# Patient Record
Sex: MALE | Race: WHITE | NOT HISPANIC OR LATINO | URBAN - METROPOLITAN AREA
[De-identification: names, ages, dates, MRNs, and addresses within clinical notes are randomized per-mention and may not be internally consistent; named-entity substitution may affect disease eponyms.]

---

## 2020-12-31 VITALS
TEMPERATURE: 98 F | OXYGEN SATURATION: 99 % | HEART RATE: 63 BPM | DIASTOLIC BLOOD PRESSURE: 53 MMHG | SYSTOLIC BLOOD PRESSURE: 139 MMHG | RESPIRATION RATE: 18 BRPM | HEIGHT: 68 IN | WEIGHT: 161.6 LBS

## 2020-12-31 RX ORDER — TAMSULOSIN HYDROCHLORIDE 0.4 MG/1
1 CAPSULE ORAL
Qty: 0 | Refills: 0 | DISCHARGE

## 2020-12-31 NOTE — PATIENT PROFILE ADULT - TOBACCO USE
Problem: VTE, Risk for  Goal: # No s/s of VTE  Outcome: Outcome Met, Continue evaluating goal progress toward completion  No s/s of VTE present. Dmitri continue to monitor.       Never smoker Former smoker

## 2020-12-31 NOTE — PRE-OP CHECKLIST - TEMPERATURE IN FAHRENHEIT (DEGREES F)
Tube feed teaching demonstrated by RN to pt's daughter who will be primarily taking care of pt upon discharge  Pt's daughter shown how to hang a bag of feed, connect and prime new tubing- as well as how to block off the J tube prior to removing the tubing connection  Pt's daughter also demonstrated correct steps to administer medication through J tube- flushed before and after- able to re-connect pt back to feed and run the feed as well  RN also discussed how often the tubing needs to be changed with the feed- as well as how often the feed get flushed- RN also showed pt how to set the feed rate and flush rate in our pump- but told the pt's dtr it might look different at home  No furthers questions at this time  98.1

## 2020-12-31 NOTE — PATIENT PROFILE ADULT - NSASFALLATTEMPTOOB_GEN_A_NUR
no

## 2021-01-04 ENCOUNTER — INPATIENT (INPATIENT)
Facility: HOSPITAL | Age: 66
LOS: 4 days | Discharge: HOME | DRG: 454 | End: 2021-01-09
Attending: NEUROLOGICAL SURGERY | Admitting: NEUROLOGICAL SURGERY
Payer: COMMERCIAL

## 2021-01-04 DIAGNOSIS — M54.9 DORSALGIA, UNSPECIFIED: ICD-10-CM

## 2021-01-04 DIAGNOSIS — Z98.1 ARTHRODESIS STATUS: ICD-10-CM

## 2021-01-04 DIAGNOSIS — I25.10 ATHEROSCLEROTIC HEART DISEASE OF NATIVE CORONARY ARTERY WITHOUT ANGINA PECTORIS: ICD-10-CM

## 2021-01-04 DIAGNOSIS — M96.3 POSTLAMINECTOMY KYPHOSIS: ICD-10-CM

## 2021-01-04 DIAGNOSIS — M43.07 SPONDYLOLYSIS, LUMBOSACRAL REGION: ICD-10-CM

## 2021-01-04 DIAGNOSIS — G97.31 INTRAOPERATIVE HEMORRHAGE AND HEMATOMA OF A NERVOUS SYSTEM ORGAN OR STRUCTURE COMPLICATING A NERVOUS SYSTEM PROCEDURE: ICD-10-CM

## 2021-01-04 DIAGNOSIS — Z98.890 OTHER SPECIFIED POSTPROCEDURAL STATES: Chronic | ICD-10-CM

## 2021-01-04 DIAGNOSIS — G97.41 ACCIDENTAL PUNCTURE OR LACERATION OF DURA DURING A PROCEDURE: ICD-10-CM

## 2021-01-04 DIAGNOSIS — Z41.9 ENCOUNTER FOR PROCEDURE FOR PURPOSES OTHER THAN REMEDYING HEALTH STATE, UNSPECIFIED: Chronic | ICD-10-CM

## 2021-01-04 DIAGNOSIS — E83.42 HYPOMAGNESEMIA: ICD-10-CM

## 2021-01-04 DIAGNOSIS — E83.39 OTHER DISORDERS OF PHOSPHORUS METABOLISM: ICD-10-CM

## 2021-01-04 DIAGNOSIS — N40.0 BENIGN PROSTATIC HYPERPLASIA WITHOUT LOWER URINARY TRACT SYMPTOMS: ICD-10-CM

## 2021-01-04 DIAGNOSIS — M48.062 SPINAL STENOSIS, LUMBAR REGION WITH NEUROGENIC CLAUDICATION: ICD-10-CM

## 2021-01-04 DIAGNOSIS — I10 ESSENTIAL (PRIMARY) HYPERTENSION: ICD-10-CM

## 2021-01-04 DIAGNOSIS — E78.5 HYPERLIPIDEMIA, UNSPECIFIED: ICD-10-CM

## 2021-01-04 DIAGNOSIS — T84.296A OTHER MECHANICAL COMPLICATION OF INTERNAL FIXATION DEVICE OF VERTEBRAE, INITIAL ENCOUNTER: ICD-10-CM

## 2021-01-04 DIAGNOSIS — Z91.013 ALLERGY TO SEAFOOD: ICD-10-CM

## 2021-01-04 DIAGNOSIS — E11.9 TYPE 2 DIABETES MELLITUS WITHOUT COMPLICATIONS: ICD-10-CM

## 2021-01-04 DIAGNOSIS — M54.17 RADICULOPATHY, LUMBOSACRAL REGION: ICD-10-CM

## 2021-01-04 LAB
ANION GAP SERPL CALC-SCNC: 15 MMOL/L — SIGNIFICANT CHANGE UP (ref 5–17)
BUN SERPL-MCNC: 15 MG/DL — SIGNIFICANT CHANGE UP (ref 7–23)
CALCIUM SERPL-MCNC: 8.1 MG/DL — LOW (ref 8.4–10.5)
CHLORIDE SERPL-SCNC: 103 MMOL/L — SIGNIFICANT CHANGE UP (ref 96–108)
CO2 SERPL-SCNC: 22 MMOL/L — SIGNIFICANT CHANGE UP (ref 22–31)
CREAT SERPL-MCNC: 0.7 MG/DL — SIGNIFICANT CHANGE UP (ref 0.5–1.3)
GLUCOSE BLDC GLUCOMTR-MCNC: 161 MG/DL — HIGH (ref 70–99)
GLUCOSE BLDC GLUCOMTR-MCNC: 211 MG/DL — HIGH (ref 70–99)
GLUCOSE BLDC GLUCOMTR-MCNC: 225 MG/DL — HIGH (ref 70–99)
GLUCOSE BLDC GLUCOMTR-MCNC: 277 MG/DL — HIGH (ref 70–99)
GLUCOSE SERPL-MCNC: 281 MG/DL — HIGH (ref 70–99)
HCT VFR BLD CALC: 33.6 % — LOW (ref 39–50)
HGB BLD-MCNC: 11.1 G/DL — LOW (ref 13–17)
MAGNESIUM SERPL-MCNC: 2 MG/DL — SIGNIFICANT CHANGE UP (ref 1.6–2.6)
MCHC RBC-ENTMCNC: 28.9 PG — SIGNIFICANT CHANGE UP (ref 27–34)
MCHC RBC-ENTMCNC: 33 GM/DL — SIGNIFICANT CHANGE UP (ref 32–36)
MCV RBC AUTO: 87.5 FL — SIGNIFICANT CHANGE UP (ref 80–100)
NRBC # BLD: 0 /100 WBCS — SIGNIFICANT CHANGE UP (ref 0–0)
PHOSPHATE SERPL-MCNC: 2.7 MG/DL — SIGNIFICANT CHANGE UP (ref 2.5–4.5)
PLATELET # BLD AUTO: 166 K/UL — SIGNIFICANT CHANGE UP (ref 150–400)
POTASSIUM SERPL-MCNC: 4 MMOL/L — SIGNIFICANT CHANGE UP (ref 3.5–5.3)
POTASSIUM SERPL-SCNC: 4 MMOL/L — SIGNIFICANT CHANGE UP (ref 3.5–5.3)
RBC # BLD: 3.84 M/UL — LOW (ref 4.2–5.8)
RBC # FLD: 13.1 % — SIGNIFICANT CHANGE UP (ref 10.3–14.5)
SODIUM SERPL-SCNC: 140 MMOL/L — SIGNIFICANT CHANGE UP (ref 135–145)
WBC # BLD: 10.54 K/UL — HIGH (ref 3.8–10.5)
WBC # FLD AUTO: 10.54 K/UL — HIGH (ref 3.8–10.5)

## 2021-01-04 PROCEDURE — 72020 X-RAY EXAM OF SPINE 1 VIEW: CPT | Mod: 26

## 2021-01-04 RX ORDER — SIMVASTATIN 20 MG/1
1 TABLET, FILM COATED ORAL
Qty: 0 | Refills: 0 | DISCHARGE

## 2021-01-04 RX ORDER — ACETAMINOPHEN 500 MG
1000 TABLET ORAL ONCE
Refills: 0 | Status: COMPLETED | OUTPATIENT
Start: 2021-01-04 | End: 2021-01-04

## 2021-01-04 RX ORDER — DULAGLUTIDE 4.5 MG/.5ML
0 INJECTION, SOLUTION SUBCUTANEOUS
Qty: 0 | Refills: 0 | DISCHARGE

## 2021-01-04 RX ORDER — DEXTROSE 50 % IN WATER 50 %
25 SYRINGE (ML) INTRAVENOUS ONCE
Refills: 0 | Status: DISCONTINUED | OUTPATIENT
Start: 2021-01-04 | End: 2021-01-09

## 2021-01-04 RX ORDER — METHOCARBAMOL 500 MG/1
500 TABLET, FILM COATED ORAL EVERY 8 HOURS
Refills: 0 | Status: DISCONTINUED | OUTPATIENT
Start: 2021-01-04 | End: 2021-01-05

## 2021-01-04 RX ORDER — SODIUM CHLORIDE 9 MG/ML
1000 INJECTION, SOLUTION INTRAVENOUS
Refills: 0 | Status: DISCONTINUED | OUTPATIENT
Start: 2021-01-04 | End: 2021-01-09

## 2021-01-04 RX ORDER — HYDROMORPHONE HYDROCHLORIDE 2 MG/ML
0.5 INJECTION INTRAMUSCULAR; INTRAVENOUS; SUBCUTANEOUS
Refills: 0 | Status: DISCONTINUED | OUTPATIENT
Start: 2021-01-04 | End: 2021-01-04

## 2021-01-04 RX ORDER — OMEPRAZOLE 10 MG/1
1 CAPSULE, DELAYED RELEASE ORAL
Qty: 0 | Refills: 0 | DISCHARGE

## 2021-01-04 RX ORDER — SITAGLIPTIN 50 MG/1
1 TABLET, FILM COATED ORAL
Qty: 0 | Refills: 0 | DISCHARGE

## 2021-01-04 RX ORDER — SODIUM CHLORIDE 9 MG/ML
1000 INJECTION, SOLUTION INTRAVENOUS
Refills: 0 | Status: DISCONTINUED | OUTPATIENT
Start: 2021-01-04 | End: 2021-01-07

## 2021-01-04 RX ORDER — BUPIVACAINE 13.3 MG/ML
20 INJECTION, SUSPENSION, LIPOSOMAL INFILTRATION ONCE
Refills: 0 | Status: DISCONTINUED | OUTPATIENT
Start: 2021-01-04 | End: 2021-01-09

## 2021-01-04 RX ORDER — SIMVASTATIN 20 MG/1
10 TABLET, FILM COATED ORAL AT BEDTIME
Refills: 0 | Status: DISCONTINUED | OUTPATIENT
Start: 2021-01-04 | End: 2021-01-09

## 2021-01-04 RX ORDER — DEXTROSE 50 % IN WATER 50 %
12.5 SYRINGE (ML) INTRAVENOUS ONCE
Refills: 0 | Status: DISCONTINUED | OUTPATIENT
Start: 2021-01-04 | End: 2021-01-09

## 2021-01-04 RX ORDER — NICOTINE POLACRILEX 2 MG
2 GUM BUCCAL
Refills: 0 | Status: DISCONTINUED | OUTPATIENT
Start: 2021-01-04 | End: 2021-01-09

## 2021-01-04 RX ORDER — HYDRALAZINE HCL 50 MG
10 TABLET ORAL
Refills: 0 | Status: DISCONTINUED | OUTPATIENT
Start: 2021-01-04 | End: 2021-01-09

## 2021-01-04 RX ORDER — ACETAMINOPHEN 500 MG
1000 TABLET ORAL EVERY 8 HOURS
Refills: 0 | Status: DISCONTINUED | OUTPATIENT
Start: 2021-01-04 | End: 2021-01-09

## 2021-01-04 RX ORDER — DIAZEPAM 5 MG
5 TABLET ORAL ONCE
Refills: 0 | Status: DISCONTINUED | OUTPATIENT
Start: 2021-01-04 | End: 2021-01-04

## 2021-01-04 RX ORDER — GLUCAGON INJECTION, SOLUTION 0.5 MG/.1ML
1 INJECTION, SOLUTION SUBCUTANEOUS ONCE
Refills: 0 | Status: DISCONTINUED | OUTPATIENT
Start: 2021-01-04 | End: 2021-01-09

## 2021-01-04 RX ORDER — METFORMIN HYDROCHLORIDE 850 MG/1
2000 TABLET ORAL
Qty: 0 | Refills: 0 | DISCHARGE

## 2021-01-04 RX ORDER — PANTOPRAZOLE SODIUM 20 MG/1
40 TABLET, DELAYED RELEASE ORAL
Refills: 0 | Status: DISCONTINUED | OUTPATIENT
Start: 2021-01-04 | End: 2021-01-09

## 2021-01-04 RX ORDER — DULOXETINE HYDROCHLORIDE 30 MG/1
20 CAPSULE, DELAYED RELEASE ORAL DAILY
Refills: 0 | Status: DISCONTINUED | OUTPATIENT
Start: 2021-01-04 | End: 2021-01-09

## 2021-01-04 RX ORDER — MAGNESIUM SULFATE 500 MG/ML
2 VIAL (ML) INJECTION
Refills: 0 | Status: COMPLETED | OUTPATIENT
Start: 2021-01-04 | End: 2021-01-04

## 2021-01-04 RX ORDER — METFORMIN HYDROCHLORIDE 850 MG/1
1 TABLET ORAL
Qty: 0 | Refills: 0 | DISCHARGE

## 2021-01-04 RX ORDER — ASPIRIN/CALCIUM CARB/MAGNESIUM 324 MG
0 TABLET ORAL
Qty: 0 | Refills: 0 | DISCHARGE

## 2021-01-04 RX ORDER — APREPITANT 80 MG/1
40 CAPSULE ORAL ONCE
Refills: 0 | Status: COMPLETED | OUTPATIENT
Start: 2021-01-04 | End: 2021-01-04

## 2021-01-04 RX ORDER — SODIUM CHLORIDE 9 MG/ML
1000 INJECTION INTRAMUSCULAR; INTRAVENOUS; SUBCUTANEOUS
Refills: 0 | Status: DISCONTINUED | OUTPATIENT
Start: 2021-01-04 | End: 2021-01-07

## 2021-01-04 RX ORDER — CEFAZOLIN SODIUM 1 G
2000 VIAL (EA) INJECTION EVERY 8 HOURS
Refills: 0 | Status: COMPLETED | OUTPATIENT
Start: 2021-01-04 | End: 2021-01-05

## 2021-01-04 RX ORDER — HYDRALAZINE HCL 50 MG
5 TABLET ORAL ONCE
Refills: 0 | Status: COMPLETED | OUTPATIENT
Start: 2021-01-04 | End: 2021-01-04

## 2021-01-04 RX ORDER — INSULIN LISPRO 100/ML
VIAL (ML) SUBCUTANEOUS
Refills: 0 | Status: DISCONTINUED | OUTPATIENT
Start: 2021-01-04 | End: 2021-01-09

## 2021-01-04 RX ORDER — DULOXETINE HYDROCHLORIDE 30 MG/1
1 CAPSULE, DELAYED RELEASE ORAL
Qty: 0 | Refills: 0 | DISCHARGE

## 2021-01-04 RX ORDER — ALBUMIN HUMAN 25 %
500 VIAL (ML) INTRAVENOUS ONCE
Refills: 0 | Status: COMPLETED | OUTPATIENT
Start: 2021-01-04 | End: 2021-01-04

## 2021-01-04 RX ORDER — METFORMIN HYDROCHLORIDE 850 MG/1
2000 TABLET ORAL DAILY
Refills: 0 | Status: DISCONTINUED | OUTPATIENT
Start: 2021-01-04 | End: 2021-01-04

## 2021-01-04 RX ORDER — OXYCODONE HYDROCHLORIDE 5 MG/1
5 TABLET ORAL
Refills: 0 | Status: DISCONTINUED | OUTPATIENT
Start: 2021-01-04 | End: 2021-01-05

## 2021-01-04 RX ORDER — ONDANSETRON 8 MG/1
4 TABLET, FILM COATED ORAL EVERY 6 HOURS
Refills: 0 | Status: DISCONTINUED | OUTPATIENT
Start: 2021-01-04 | End: 2021-01-09

## 2021-01-04 RX ORDER — METOCLOPRAMIDE HCL 10 MG
10 TABLET ORAL EVERY 8 HOURS
Refills: 0 | Status: DISCONTINUED | OUTPATIENT
Start: 2021-01-04 | End: 2021-01-09

## 2021-01-04 RX ORDER — ENOXAPARIN SODIUM 100 MG/ML
40 INJECTION SUBCUTANEOUS AT BEDTIME
Refills: 0 | Status: DISCONTINUED | OUTPATIENT
Start: 2021-01-05 | End: 2021-01-09

## 2021-01-04 RX ORDER — POVIDONE-IODINE 5 %
1 AEROSOL (ML) TOPICAL ONCE
Refills: 0 | Status: COMPLETED | OUTPATIENT
Start: 2021-01-04 | End: 2021-01-04

## 2021-01-04 RX ORDER — SENNA PLUS 8.6 MG/1
2 TABLET ORAL AT BEDTIME
Refills: 0 | Status: DISCONTINUED | OUTPATIENT
Start: 2021-01-04 | End: 2021-01-09

## 2021-01-04 RX ORDER — DEXTROSE 50 % IN WATER 50 %
15 SYRINGE (ML) INTRAVENOUS ONCE
Refills: 0 | Status: DISCONTINUED | OUTPATIENT
Start: 2021-01-04 | End: 2021-01-09

## 2021-01-04 RX ORDER — LABETALOL HCL 100 MG
10 TABLET ORAL ONCE
Refills: 0 | Status: COMPLETED | OUTPATIENT
Start: 2021-01-04 | End: 2021-01-04

## 2021-01-04 RX ORDER — GABAPENTIN 400 MG/1
300 CAPSULE ORAL ONCE
Refills: 0 | Status: COMPLETED | OUTPATIENT
Start: 2021-01-04 | End: 2021-01-04

## 2021-01-04 RX ADMIN — Medication 4: at 23:05

## 2021-01-04 RX ADMIN — Medication 100 MILLIGRAM(S): at 19:25

## 2021-01-04 RX ADMIN — Medication 10 MILLIGRAM(S): at 14:42

## 2021-01-04 RX ADMIN — OXYCODONE HYDROCHLORIDE 5 MILLIGRAM(S): 5 TABLET ORAL at 21:36

## 2021-01-04 RX ADMIN — Medication 5 MILLIGRAM(S): at 19:24

## 2021-01-04 RX ADMIN — Medication 250 MILLILITER(S): at 09:00

## 2021-01-04 RX ADMIN — HYDROMORPHONE HYDROCHLORIDE 0.5 MILLIGRAM(S): 2 INJECTION INTRAMUSCULAR; INTRAVENOUS; SUBCUTANEOUS at 15:48

## 2021-01-04 RX ADMIN — GABAPENTIN 300 MILLIGRAM(S): 400 CAPSULE ORAL at 06:45

## 2021-01-04 RX ADMIN — Medication 5 MILLIGRAM(S): at 16:25

## 2021-01-04 RX ADMIN — HYDROMORPHONE HYDROCHLORIDE 0.5 MILLIGRAM(S): 2 INJECTION INTRAMUSCULAR; INTRAVENOUS; SUBCUTANEOUS at 18:31

## 2021-01-04 RX ADMIN — Medication 100 GRAM(S): at 09:30

## 2021-01-04 RX ADMIN — SENNA PLUS 2 TABLET(S): 8.6 TABLET ORAL at 23:05

## 2021-01-04 RX ADMIN — ONDANSETRON 4 MILLIGRAM(S): 8 TABLET, FILM COATED ORAL at 17:38

## 2021-01-04 RX ADMIN — Medication 50 MILLIGRAM(S): at 23:04

## 2021-01-04 RX ADMIN — HYDROMORPHONE HYDROCHLORIDE 0.5 MILLIGRAM(S): 2 INJECTION INTRAMUSCULAR; INTRAVENOUS; SUBCUTANEOUS at 14:53

## 2021-01-04 RX ADMIN — Medication 1000 MILLIGRAM(S): at 06:48

## 2021-01-04 RX ADMIN — Medication 1 APPLICATION(S): at 06:51

## 2021-01-04 RX ADMIN — HYDROMORPHONE HYDROCHLORIDE 0.5 MILLIGRAM(S): 2 INJECTION INTRAMUSCULAR; INTRAVENOUS; SUBCUTANEOUS at 15:41

## 2021-01-04 RX ADMIN — HYDROMORPHONE HYDROCHLORIDE 0.5 MILLIGRAM(S): 2 INJECTION INTRAMUSCULAR; INTRAVENOUS; SUBCUTANEOUS at 15:38

## 2021-01-04 RX ADMIN — HYDROMORPHONE HYDROCHLORIDE 0.5 MILLIGRAM(S): 2 INJECTION INTRAMUSCULAR; INTRAVENOUS; SUBCUTANEOUS at 17:46

## 2021-01-04 RX ADMIN — Medication 2 MILLIGRAM(S): at 23:05

## 2021-01-04 RX ADMIN — Medication 1000 MILLIGRAM(S): at 06:45

## 2021-01-04 RX ADMIN — SIMVASTATIN 10 MILLIGRAM(S): 20 TABLET, FILM COATED ORAL at 23:04

## 2021-01-04 RX ADMIN — Medication 6: at 14:52

## 2021-01-04 RX ADMIN — OXYCODONE HYDROCHLORIDE 5 MILLIGRAM(S): 5 TABLET ORAL at 21:10

## 2021-01-04 RX ADMIN — Medication 1000 MILLIGRAM(S): at 23:04

## 2021-01-04 RX ADMIN — Medication 100 GRAM(S): at 08:30

## 2021-01-04 RX ADMIN — APREPITANT 40 MILLIGRAM(S): 80 CAPSULE ORAL at 06:45

## 2021-01-04 RX ADMIN — METHOCARBAMOL 500 MILLIGRAM(S): 500 TABLET, FILM COATED ORAL at 23:04

## 2021-01-04 RX ADMIN — Medication 10 MILLIGRAM(S): at 18:33

## 2021-01-04 NOTE — H&P ADULT - HISTORY OF PRESENT ILLNESS
patient presents with chronic back pain progressing over years. He had a laminectomy 30 years ago as well as 2 anterior cervical surgeries in 2012, and 2017. all surgeries went fine with good recovery. in december 2017 he had a L2-S1 Decompression and fusion for back and left leg pain which improved post op.

## 2021-01-04 NOTE — CHART NOTE - NSCHARTNOTEFT_GEN_A_CORE
Neurosurgical Indications for Screening Dopplers on Admission:       1) Known hypercoagulation disorder (h/o VTE, thrombophilia, HIT, etc.)   2) Admitted from prolonged stay from another institution (straight forward ER transfers not included)  3) Presenting with significant leg immobility   4) Presenting with signs and symptoms of VTE?    5) With significant critical illness, Including "found down" for unknown period of time in HPI  6) With significant neurotrauma (TBI, SCI / TLS spine fractures)   7) Who are comatose   8) With known malignancy (e.g. glioblastoma multiforme, meningioma, etc.). Excludes IA chemo 23hr observation stays  9) On hemodialysis   10) Who have received platelet transfusion or antithrombotic reversal agents recently   11) Who have had recent major orthopedic surgery          Screening dopplers indicated?   Y _   N x    DVT Prophylaxis:  _x SCD's   _x chemoprophylaxis    Lovenox 40mg SQ QHS start POD1

## 2021-01-04 NOTE — BRIEF OPERATIVE NOTE - NSICDXBRIEFPROCEDURE_GEN_ALL_CORE_FT
PROCEDURES:  Fusion, spine, lumbar, TLIF, complex 04-Jan-2021 15:23:30  Alex Porter  Fusion, spine, lumbar, PLIF, with discectomy or laminectomy 04-Jan-2021 15:23:19  Alex Porter

## 2021-01-04 NOTE — H&P ADULT - NSICDXPASTSURGICALHX_GEN_ALL_CORE_FT
PAST SURGICAL HISTORY:  H/O laminectomy     Surgery, elective cervical spine  2010, 2017    Surgery, elective lumbar fusion 2017

## 2021-01-04 NOTE — H&P ADULT - PROBLEM SELECTOR PLAN 1
Proceed with sx  - Brace Via Abel  - keep flat until 1/6 due to ventral dural tear  - Drains off suction due to dural tear  - Follow up with Dr. Isaías rausch in terms of wound care

## 2021-01-04 NOTE — H&P ADULT - NSHPREVIEWOFSYSTEMS_GEN_ALL_CORE
REVIEW OF SYSTEMS      General:	no recent illnesses, no recent wt gain/loss    Skin/Breast:  intact  	  Ophthalmologic:  negative,   	  ENMT:	negative    Respiratory and Thorax: no coughing, wheezing, recent URI  	  Cardiovascular: no chest pain, DWYER    Gastrointestinal:	soft, non tender    Genitourinary: no frequency, dysuria    Musculoskeletal:	negative    Neurological:	see HPI    Psychiatric:	negative    Hematology/Lymphatics:	negative    Endocrine:  	negative    Allergic/Immunologic:  Negative

## 2021-01-04 NOTE — H&P ADULT - NSHPPHYSICALEXAM_GEN_ALL_CORE
AOX3, sitting comfortably in Pre op holding  5/5 Bilateral lower extremities  1+ bilateral patellar reflexes 1+ bilateral achilles reflexes  no sensory deficits of lower extremities   Tenderness to palpation b/l SIJs

## 2021-01-05 LAB
A1C WITH ESTIMATED AVERAGE GLUCOSE RESULT: 6.9 % — HIGH (ref 4–5.6)
ANION GAP SERPL CALC-SCNC: 10 MMOL/L — SIGNIFICANT CHANGE UP (ref 5–17)
BUN SERPL-MCNC: 14 MG/DL — SIGNIFICANT CHANGE UP (ref 7–23)
CALCIUM SERPL-MCNC: 8.7 MG/DL — SIGNIFICANT CHANGE UP (ref 8.4–10.5)
CHLORIDE SERPL-SCNC: 105 MMOL/L — SIGNIFICANT CHANGE UP (ref 96–108)
CO2 SERPL-SCNC: 27 MMOL/L — SIGNIFICANT CHANGE UP (ref 22–31)
CREAT SERPL-MCNC: 0.7 MG/DL — SIGNIFICANT CHANGE UP (ref 0.5–1.3)
ESTIMATED AVERAGE GLUCOSE: 151 MG/DL — HIGH (ref 68–114)
GLUCOSE BLDC GLUCOMTR-MCNC: 135 MG/DL — HIGH (ref 70–99)
GLUCOSE BLDC GLUCOMTR-MCNC: 145 MG/DL — HIGH (ref 70–99)
GLUCOSE BLDC GLUCOMTR-MCNC: 158 MG/DL — HIGH (ref 70–99)
GLUCOSE BLDC GLUCOMTR-MCNC: 214 MG/DL — HIGH (ref 70–99)
GLUCOSE SERPL-MCNC: 230 MG/DL — HIGH (ref 70–99)
HCT VFR BLD CALC: 32.9 % — LOW (ref 39–50)
HCV AB S/CO SERPL IA: 0.04 S/CO — SIGNIFICANT CHANGE UP
HCV AB SERPL-IMP: SIGNIFICANT CHANGE UP
HGB BLD-MCNC: 10.8 G/DL — LOW (ref 13–17)
MAGNESIUM SERPL-MCNC: 2.1 MG/DL — SIGNIFICANT CHANGE UP (ref 1.6–2.6)
MCHC RBC-ENTMCNC: 29.6 PG — SIGNIFICANT CHANGE UP (ref 27–34)
MCHC RBC-ENTMCNC: 32.8 GM/DL — SIGNIFICANT CHANGE UP (ref 32–36)
MCV RBC AUTO: 90.1 FL — SIGNIFICANT CHANGE UP (ref 80–100)
NRBC # BLD: 0 /100 WBCS — SIGNIFICANT CHANGE UP (ref 0–0)
PHOSPHATE SERPL-MCNC: 2.4 MG/DL — LOW (ref 2.5–4.5)
PLATELET # BLD AUTO: 177 K/UL — SIGNIFICANT CHANGE UP (ref 150–400)
POTASSIUM SERPL-MCNC: 4.5 MMOL/L — SIGNIFICANT CHANGE UP (ref 3.5–5.3)
POTASSIUM SERPL-SCNC: 4.5 MMOL/L — SIGNIFICANT CHANGE UP (ref 3.5–5.3)
RBC # BLD: 3.65 M/UL — LOW (ref 4.2–5.8)
RBC # FLD: 13.5 % — SIGNIFICANT CHANGE UP (ref 10.3–14.5)
SODIUM SERPL-SCNC: 142 MMOL/L — SIGNIFICANT CHANGE UP (ref 135–145)
WBC # BLD: 15.8 K/UL — HIGH (ref 3.8–10.5)
WBC # FLD AUTO: 15.8 K/UL — HIGH (ref 3.8–10.5)

## 2021-01-05 PROCEDURE — 99024 POSTOP FOLLOW-UP VISIT: CPT

## 2021-01-05 RX ORDER — OXYCODONE HYDROCHLORIDE 5 MG/1
5 TABLET ORAL EVERY 4 HOURS
Refills: 0 | Status: DISCONTINUED | OUTPATIENT
Start: 2021-01-05 | End: 2021-01-09

## 2021-01-05 RX ORDER — OXYCODONE HYDROCHLORIDE 5 MG/1
10 TABLET ORAL EVERY 6 HOURS
Refills: 0 | Status: DISCONTINUED | OUTPATIENT
Start: 2021-01-05 | End: 2021-01-05

## 2021-01-05 RX ORDER — OXYCODONE HYDROCHLORIDE 5 MG/1
10 TABLET ORAL EVERY 4 HOURS
Refills: 0 | Status: DISCONTINUED | OUTPATIENT
Start: 2021-01-05 | End: 2021-01-09

## 2021-01-05 RX ORDER — OXYCODONE AND ACETAMINOPHEN 5; 325 MG/1; MG/1
1 TABLET ORAL EVERY 4 HOURS
Refills: 0 | Status: DISCONTINUED | OUTPATIENT
Start: 2021-01-05 | End: 2021-01-05

## 2021-01-05 RX ORDER — METHOCARBAMOL 500 MG/1
750 TABLET, FILM COATED ORAL EVERY 8 HOURS
Refills: 0 | Status: DISCONTINUED | OUTPATIENT
Start: 2021-01-05 | End: 2021-01-08

## 2021-01-05 RX ADMIN — Medication 100 MILLIGRAM(S): at 03:23

## 2021-01-05 RX ADMIN — DULOXETINE HYDROCHLORIDE 20 MILLIGRAM(S): 30 CAPSULE, DELAYED RELEASE ORAL at 10:35

## 2021-01-05 RX ADMIN — Medication 2 MILLIGRAM(S): at 18:05

## 2021-01-05 RX ADMIN — ONDANSETRON 4 MILLIGRAM(S): 8 TABLET, FILM COATED ORAL at 18:06

## 2021-01-05 RX ADMIN — Medication 75 MILLIGRAM(S): at 21:42

## 2021-01-05 RX ADMIN — ONDANSETRON 4 MILLIGRAM(S): 8 TABLET, FILM COATED ORAL at 12:14

## 2021-01-05 RX ADMIN — Medication 1000 MILLIGRAM(S): at 15:10

## 2021-01-05 RX ADMIN — SENNA PLUS 2 TABLET(S): 8.6 TABLET ORAL at 21:41

## 2021-01-05 RX ADMIN — OXYCODONE HYDROCHLORIDE 10 MILLIGRAM(S): 5 TABLET ORAL at 12:20

## 2021-01-05 RX ADMIN — Medication 1000 MILLIGRAM(S): at 14:16

## 2021-01-05 RX ADMIN — Medication 75 MILLIGRAM(S): at 14:16

## 2021-01-05 RX ADMIN — OXYCODONE HYDROCHLORIDE 10 MILLIGRAM(S): 5 TABLET ORAL at 13:17

## 2021-01-05 RX ADMIN — Medication 1000 MILLIGRAM(S): at 07:14

## 2021-01-05 RX ADMIN — SODIUM CHLORIDE 100 MILLILITER(S): 9 INJECTION INTRAMUSCULAR; INTRAVENOUS; SUBCUTANEOUS at 02:20

## 2021-01-05 RX ADMIN — Medication 4: at 07:29

## 2021-01-05 RX ADMIN — Medication 1000 MILLIGRAM(S): at 21:41

## 2021-01-05 RX ADMIN — METHOCARBAMOL 500 MILLIGRAM(S): 500 TABLET, FILM COATED ORAL at 07:14

## 2021-01-05 RX ADMIN — OXYCODONE HYDROCHLORIDE 5 MILLIGRAM(S): 5 TABLET ORAL at 08:14

## 2021-01-05 RX ADMIN — Medication 25 MILLIGRAM(S): at 10:35

## 2021-01-05 RX ADMIN — METHOCARBAMOL 750 MILLIGRAM(S): 500 TABLET, FILM COATED ORAL at 21:52

## 2021-01-05 RX ADMIN — PANTOPRAZOLE SODIUM 40 MILLIGRAM(S): 20 TABLET, DELAYED RELEASE ORAL at 07:14

## 2021-01-05 RX ADMIN — SIMVASTATIN 10 MILLIGRAM(S): 20 TABLET, FILM COATED ORAL at 21:42

## 2021-01-05 RX ADMIN — Medication 2 MILLIGRAM(S): at 07:14

## 2021-01-05 RX ADMIN — Medication 2 MILLIGRAM(S): at 23:05

## 2021-01-05 RX ADMIN — Medication 1000 MILLIGRAM(S): at 00:00

## 2021-01-05 RX ADMIN — OXYCODONE HYDROCHLORIDE 5 MILLIGRAM(S): 5 TABLET ORAL at 18:04

## 2021-01-05 RX ADMIN — Medication 2: at 22:45

## 2021-01-05 RX ADMIN — OXYCODONE HYDROCHLORIDE 5 MILLIGRAM(S): 5 TABLET ORAL at 19:00

## 2021-01-05 RX ADMIN — Medication 2 MILLIGRAM(S): at 12:14

## 2021-01-05 RX ADMIN — Medication 1000 MILLIGRAM(S): at 22:41

## 2021-01-05 RX ADMIN — METHOCARBAMOL 750 MILLIGRAM(S): 500 TABLET, FILM COATED ORAL at 14:16

## 2021-01-05 RX ADMIN — ENOXAPARIN SODIUM 40 MILLIGRAM(S): 100 INJECTION SUBCUTANEOUS at 21:42

## 2021-01-05 RX ADMIN — SODIUM CHLORIDE 100 MILLILITER(S): 9 INJECTION INTRAMUSCULAR; INTRAVENOUS; SUBCUTANEOUS at 21:52

## 2021-01-05 RX ADMIN — Medication 50 MILLIGRAM(S): at 07:14

## 2021-01-05 RX ADMIN — Medication 1000 MILLIGRAM(S): at 08:14

## 2021-01-05 RX ADMIN — SODIUM CHLORIDE 100 MILLILITER(S): 9 INJECTION INTRAMUSCULAR; INTRAVENOUS; SUBCUTANEOUS at 12:20

## 2021-01-05 RX ADMIN — OXYCODONE HYDROCHLORIDE 5 MILLIGRAM(S): 5 TABLET ORAL at 07:15

## 2021-01-05 NOTE — PROGRESS NOTE ADULT - ATTENDING COMMENTS
lying flat  incisional pain moderate  denies leg pain  5- left IP otherwise 5/5 IP Q TA EHL gastroc bilat, lt touch symmetric    flat in bed, may turn side to side  plastics/drains  discussed anticipated recovery process, mobilize with PT tomorrow

## 2021-01-06 LAB
ANION GAP SERPL CALC-SCNC: 9 MMOL/L — SIGNIFICANT CHANGE UP (ref 5–17)
APPEARANCE UR: CLEAR — SIGNIFICANT CHANGE UP
BACTERIA # UR AUTO: PRESENT /HPF
BILIRUB UR-MCNC: NEGATIVE — SIGNIFICANT CHANGE UP
BUN SERPL-MCNC: 11 MG/DL — SIGNIFICANT CHANGE UP (ref 7–23)
CALCIUM SERPL-MCNC: 8 MG/DL — LOW (ref 8.4–10.5)
CHLORIDE SERPL-SCNC: 103 MMOL/L — SIGNIFICANT CHANGE UP (ref 96–108)
CO2 SERPL-SCNC: 26 MMOL/L — SIGNIFICANT CHANGE UP (ref 22–31)
COLOR SPEC: YELLOW — SIGNIFICANT CHANGE UP
CREAT SERPL-MCNC: 0.71 MG/DL — SIGNIFICANT CHANGE UP (ref 0.5–1.3)
DIFF PNL FLD: ABNORMAL
EPI CELLS # UR: SIGNIFICANT CHANGE UP /HPF (ref 0–5)
GLUCOSE BLDC GLUCOMTR-MCNC: 150 MG/DL — HIGH (ref 70–99)
GLUCOSE BLDC GLUCOMTR-MCNC: 165 MG/DL — HIGH (ref 70–99)
GLUCOSE BLDC GLUCOMTR-MCNC: 198 MG/DL — HIGH (ref 70–99)
GLUCOSE BLDC GLUCOMTR-MCNC: 209 MG/DL — HIGH (ref 70–99)
GLUCOSE SERPL-MCNC: 166 MG/DL — HIGH (ref 70–99)
GLUCOSE UR QL: >=1000
HCT VFR BLD CALC: 29.7 % — LOW (ref 39–50)
HGB BLD-MCNC: 9.7 G/DL — LOW (ref 13–17)
KETONES UR-MCNC: 15 MG/DL
LEUKOCYTE ESTERASE UR-ACNC: NEGATIVE — SIGNIFICANT CHANGE UP
MAGNESIUM SERPL-MCNC: 1.4 MG/DL — LOW (ref 1.6–2.6)
MCHC RBC-ENTMCNC: 29.7 PG — SIGNIFICANT CHANGE UP (ref 27–34)
MCHC RBC-ENTMCNC: 32.7 GM/DL — SIGNIFICANT CHANGE UP (ref 32–36)
MCV RBC AUTO: 90.8 FL — SIGNIFICANT CHANGE UP (ref 80–100)
NITRITE UR-MCNC: NEGATIVE — SIGNIFICANT CHANGE UP
NRBC # BLD: 0 /100 WBCS — SIGNIFICANT CHANGE UP (ref 0–0)
PH UR: 6.5 — SIGNIFICANT CHANGE UP (ref 5–8)
PHOSPHATE SERPL-MCNC: 1.9 MG/DL — LOW (ref 2.5–4.5)
PLATELET # BLD AUTO: 156 K/UL — SIGNIFICANT CHANGE UP (ref 150–400)
POTASSIUM SERPL-MCNC: 4.1 MMOL/L — SIGNIFICANT CHANGE UP (ref 3.5–5.3)
POTASSIUM SERPL-SCNC: 4.1 MMOL/L — SIGNIFICANT CHANGE UP (ref 3.5–5.3)
PROT UR-MCNC: NEGATIVE MG/DL — SIGNIFICANT CHANGE UP
RBC # BLD: 3.27 M/UL — LOW (ref 4.2–5.8)
RBC # FLD: 13.6 % — SIGNIFICANT CHANGE UP (ref 10.3–14.5)
RBC CASTS # UR COMP ASSIST: < 5 /HPF — SIGNIFICANT CHANGE UP
SODIUM SERPL-SCNC: 138 MMOL/L — SIGNIFICANT CHANGE UP (ref 135–145)
SP GR SPEC: 1.01 — SIGNIFICANT CHANGE UP (ref 1–1.03)
UROBILINOGEN FLD QL: 0.2 E.U./DL — SIGNIFICANT CHANGE UP
WBC # BLD: 10.66 K/UL — HIGH (ref 3.8–10.5)
WBC # FLD AUTO: 10.66 K/UL — HIGH (ref 3.8–10.5)
WBC UR QL: < 5 /HPF — SIGNIFICANT CHANGE UP

## 2021-01-06 PROCEDURE — 71045 X-RAY EXAM CHEST 1 VIEW: CPT | Mod: 26

## 2021-01-06 PROCEDURE — 99024 POSTOP FOLLOW-UP VISIT: CPT

## 2021-01-06 RX ORDER — BACITRACIN ZINC 500 UNIT/G
1 OINTMENT IN PACKET (EA) TOPICAL
Refills: 0 | Status: DISCONTINUED | OUTPATIENT
Start: 2021-01-06 | End: 2021-01-09

## 2021-01-06 RX ORDER — SODIUM,POTASSIUM PHOSPHATES 278-250MG
1 POWDER IN PACKET (EA) ORAL ONCE
Refills: 0 | Status: COMPLETED | OUTPATIENT
Start: 2021-01-06 | End: 2021-01-07

## 2021-01-06 RX ORDER — MAGNESIUM SULFATE 500 MG/ML
1 VIAL (ML) INJECTION ONCE
Refills: 0 | Status: COMPLETED | OUTPATIENT
Start: 2021-01-06 | End: 2021-01-06

## 2021-01-06 RX ADMIN — Medication 1000 MILLIGRAM(S): at 15:05

## 2021-01-06 RX ADMIN — OXYCODONE HYDROCHLORIDE 5 MILLIGRAM(S): 5 TABLET ORAL at 03:23

## 2021-01-06 RX ADMIN — OXYCODONE HYDROCHLORIDE 5 MILLIGRAM(S): 5 TABLET ORAL at 12:15

## 2021-01-06 RX ADMIN — Medication 2 MILLIGRAM(S): at 12:17

## 2021-01-06 RX ADMIN — METHOCARBAMOL 750 MILLIGRAM(S): 500 TABLET, FILM COATED ORAL at 22:51

## 2021-01-06 RX ADMIN — SENNA PLUS 2 TABLET(S): 8.6 TABLET ORAL at 21:02

## 2021-01-06 RX ADMIN — Medication 1 APPLICATION(S): at 17:30

## 2021-01-06 RX ADMIN — ENOXAPARIN SODIUM 40 MILLIGRAM(S): 100 INJECTION SUBCUTANEOUS at 21:02

## 2021-01-06 RX ADMIN — METHOCARBAMOL 750 MILLIGRAM(S): 500 TABLET, FILM COATED ORAL at 15:05

## 2021-01-06 RX ADMIN — Medication 10 MILLIGRAM(S): at 07:17

## 2021-01-06 RX ADMIN — Medication 2 MILLIGRAM(S): at 17:30

## 2021-01-06 RX ADMIN — Medication 75 MILLIGRAM(S): at 12:17

## 2021-01-06 RX ADMIN — ONDANSETRON 4 MILLIGRAM(S): 8 TABLET, FILM COATED ORAL at 03:23

## 2021-01-06 RX ADMIN — DULOXETINE HYDROCHLORIDE 20 MILLIGRAM(S): 30 CAPSULE, DELAYED RELEASE ORAL at 12:17

## 2021-01-06 RX ADMIN — PANTOPRAZOLE SODIUM 40 MILLIGRAM(S): 20 TABLET, DELAYED RELEASE ORAL at 06:22

## 2021-01-06 RX ADMIN — Medication 2: at 17:33

## 2021-01-06 RX ADMIN — SODIUM CHLORIDE 100 MILLILITER(S): 9 INJECTION INTRAMUSCULAR; INTRAVENOUS; SUBCUTANEOUS at 07:20

## 2021-01-06 RX ADMIN — OXYCODONE HYDROCHLORIDE 5 MILLIGRAM(S): 5 TABLET ORAL at 04:00

## 2021-01-06 RX ADMIN — Medication 75 MILLIGRAM(S): at 06:22

## 2021-01-06 RX ADMIN — ONDANSETRON 4 MILLIGRAM(S): 8 TABLET, FILM COATED ORAL at 12:15

## 2021-01-06 RX ADMIN — Medication 100 GRAM(S): at 17:30

## 2021-01-06 RX ADMIN — Medication 1000 MILLIGRAM(S): at 21:01

## 2021-01-06 RX ADMIN — METHOCARBAMOL 750 MILLIGRAM(S): 500 TABLET, FILM COATED ORAL at 06:22

## 2021-01-06 RX ADMIN — SIMVASTATIN 10 MILLIGRAM(S): 20 TABLET, FILM COATED ORAL at 21:02

## 2021-01-06 RX ADMIN — Medication 2 MILLIGRAM(S): at 06:22

## 2021-01-06 RX ADMIN — Medication 75 MILLIGRAM(S): at 21:02

## 2021-01-06 RX ADMIN — Medication 2: at 12:18

## 2021-01-06 RX ADMIN — Medication 1000 MILLIGRAM(S): at 06:22

## 2021-01-06 RX ADMIN — Medication 4: at 22:50

## 2021-01-06 NOTE — PHYSICAL THERAPY INITIAL EVALUATION ADULT - ADDITIONAL COMMENTS
Pt lives in a family home in [place] with 5 steps to enter home. [facility] after d/c to an elevator access apartment with no steps to enter.

## 2021-01-06 NOTE — PHYSICAL THERAPY INITIAL EVALUATION ADULT - PERTINENT HX OF CURRENT PROBLEM, REHAB EVAL
patient presents with chronic back pain progressing over years. He had a laminectomy 30 years ago as well as 2 anterior cervical surgeries in 2012, and 2017. all surgeries went fine with good recovery. in december 2017 he had a L2-S1 Decompression and fusion for back and left leg pain which improved post op

## 2021-01-07 LAB
ALBUMIN SERPL ELPH-MCNC: 3.3 G/DL — SIGNIFICANT CHANGE UP (ref 3.3–5)
ALP SERPL-CCNC: 67 U/L — SIGNIFICANT CHANGE UP (ref 40–120)
ALT FLD-CCNC: 12 U/L — SIGNIFICANT CHANGE UP (ref 10–45)
ANION GAP SERPL CALC-SCNC: 9 MMOL/L — SIGNIFICANT CHANGE UP (ref 5–17)
AST SERPL-CCNC: 16 U/L — SIGNIFICANT CHANGE UP (ref 10–40)
BILIRUB SERPL-MCNC: 0.5 MG/DL — SIGNIFICANT CHANGE UP (ref 0.2–1.2)
BUN SERPL-MCNC: 8 MG/DL — SIGNIFICANT CHANGE UP (ref 7–23)
CALCIUM SERPL-MCNC: 8.5 MG/DL — SIGNIFICANT CHANGE UP (ref 8.4–10.5)
CHLORIDE SERPL-SCNC: 101 MMOL/L — SIGNIFICANT CHANGE UP (ref 96–108)
CO2 SERPL-SCNC: 27 MMOL/L — SIGNIFICANT CHANGE UP (ref 22–31)
CREAT SERPL-MCNC: 0.71 MG/DL — SIGNIFICANT CHANGE UP (ref 0.5–1.3)
GLUCOSE BLDC GLUCOMTR-MCNC: 180 MG/DL — HIGH (ref 70–99)
GLUCOSE BLDC GLUCOMTR-MCNC: 226 MG/DL — HIGH (ref 70–99)
GLUCOSE BLDC GLUCOMTR-MCNC: 241 MG/DL — HIGH (ref 70–99)
GLUCOSE BLDC GLUCOMTR-MCNC: 246 MG/DL — HIGH (ref 70–99)
GLUCOSE SERPL-MCNC: 184 MG/DL — HIGH (ref 70–99)
HCT VFR BLD CALC: 31.5 % — LOW (ref 39–50)
HGB BLD-MCNC: 10.3 G/DL — LOW (ref 13–17)
MCHC RBC-ENTMCNC: 29.4 PG — SIGNIFICANT CHANGE UP (ref 27–34)
MCHC RBC-ENTMCNC: 32.7 GM/DL — SIGNIFICANT CHANGE UP (ref 32–36)
MCV RBC AUTO: 90 FL — SIGNIFICANT CHANGE UP (ref 80–100)
NRBC # BLD: 0 /100 WBCS — SIGNIFICANT CHANGE UP (ref 0–0)
PLATELET # BLD AUTO: 148 K/UL — LOW (ref 150–400)
POTASSIUM SERPL-MCNC: 4.4 MMOL/L — SIGNIFICANT CHANGE UP (ref 3.5–5.3)
POTASSIUM SERPL-SCNC: 4.4 MMOL/L — SIGNIFICANT CHANGE UP (ref 3.5–5.3)
PROT SERPL-MCNC: 6 G/DL — SIGNIFICANT CHANGE UP (ref 6–8.3)
RBC # BLD: 3.5 M/UL — LOW (ref 4.2–5.8)
RBC # FLD: 13.2 % — SIGNIFICANT CHANGE UP (ref 10.3–14.5)
SODIUM SERPL-SCNC: 137 MMOL/L — SIGNIFICANT CHANGE UP (ref 135–145)
WBC # BLD: 10.59 K/UL — HIGH (ref 3.8–10.5)
WBC # FLD AUTO: 10.59 K/UL — HIGH (ref 3.8–10.5)

## 2021-01-07 PROCEDURE — 74018 RADEX ABDOMEN 1 VIEW: CPT | Mod: 26

## 2021-01-07 PROCEDURE — 99024 POSTOP FOLLOW-UP VISIT: CPT

## 2021-01-07 RX ORDER — LISINOPRIL 2.5 MG/1
40 TABLET ORAL DAILY
Refills: 0 | Status: DISCONTINUED | OUTPATIENT
Start: 2021-01-07 | End: 2021-01-09

## 2021-01-07 RX ORDER — TAMSULOSIN HYDROCHLORIDE 0.4 MG/1
0.4 CAPSULE ORAL AT BEDTIME
Refills: 0 | Status: DISCONTINUED | OUTPATIENT
Start: 2021-01-07 | End: 2021-01-09

## 2021-01-07 RX ORDER — SODIUM CHLORIDE 9 MG/ML
1000 INJECTION INTRAMUSCULAR; INTRAVENOUS; SUBCUTANEOUS
Refills: 0 | Status: DISCONTINUED | OUTPATIENT
Start: 2021-01-07 | End: 2021-01-09

## 2021-01-07 RX ORDER — POLYETHYLENE GLYCOL 3350 17 G/17G
17 POWDER, FOR SOLUTION ORAL DAILY
Refills: 0 | Status: DISCONTINUED | OUTPATIENT
Start: 2021-01-07 | End: 2021-01-09

## 2021-01-07 RX ORDER — POLYETHYLENE GLYCOL 3350 17 G/17G
17 POWDER, FOR SOLUTION ORAL ONCE
Refills: 0 | Status: COMPLETED | OUTPATIENT
Start: 2021-01-07 | End: 2021-01-07

## 2021-01-07 RX ADMIN — Medication 1 APPLICATION(S): at 17:54

## 2021-01-07 RX ADMIN — Medication 1000 MILLIGRAM(S): at 13:49

## 2021-01-07 RX ADMIN — Medication 1 PACKET(S): at 08:48

## 2021-01-07 RX ADMIN — SENNA PLUS 2 TABLET(S): 8.6 TABLET ORAL at 22:00

## 2021-01-07 RX ADMIN — Medication 75 MILLIGRAM(S): at 07:25

## 2021-01-07 RX ADMIN — LISINOPRIL 40 MILLIGRAM(S): 2.5 TABLET ORAL at 10:09

## 2021-01-07 RX ADMIN — Medication 1 APPLICATION(S): at 07:24

## 2021-01-07 RX ADMIN — DULOXETINE HYDROCHLORIDE 20 MILLIGRAM(S): 30 CAPSULE, DELAYED RELEASE ORAL at 12:28

## 2021-01-07 RX ADMIN — Medication 4: at 17:16

## 2021-01-07 RX ADMIN — Medication 4: at 12:32

## 2021-01-07 RX ADMIN — METHOCARBAMOL 750 MILLIGRAM(S): 500 TABLET, FILM COATED ORAL at 13:49

## 2021-01-07 RX ADMIN — Medication 2: at 08:48

## 2021-01-07 RX ADMIN — Medication 2 MILLIGRAM(S): at 12:28

## 2021-01-07 RX ADMIN — POLYETHYLENE GLYCOL 3350 17 GRAM(S): 17 POWDER, FOR SOLUTION ORAL at 10:09

## 2021-01-07 RX ADMIN — METHOCARBAMOL 750 MILLIGRAM(S): 500 TABLET, FILM COATED ORAL at 23:34

## 2021-01-07 RX ADMIN — ENOXAPARIN SODIUM 40 MILLIGRAM(S): 100 INJECTION SUBCUTANEOUS at 22:00

## 2021-01-07 RX ADMIN — Medication 1000 MILLIGRAM(S): at 22:00

## 2021-01-07 RX ADMIN — SIMVASTATIN 10 MILLIGRAM(S): 20 TABLET, FILM COATED ORAL at 22:00

## 2021-01-07 RX ADMIN — Medication 75 MILLIGRAM(S): at 22:00

## 2021-01-07 RX ADMIN — TAMSULOSIN HYDROCHLORIDE 0.4 MILLIGRAM(S): 0.4 CAPSULE ORAL at 10:09

## 2021-01-07 RX ADMIN — ONDANSETRON 4 MILLIGRAM(S): 8 TABLET, FILM COATED ORAL at 17:54

## 2021-01-07 RX ADMIN — Medication 2 MILLIGRAM(S): at 07:26

## 2021-01-07 RX ADMIN — Medication 2 MILLIGRAM(S): at 23:34

## 2021-01-07 RX ADMIN — OXYCODONE HYDROCHLORIDE 10 MILLIGRAM(S): 5 TABLET ORAL at 12:29

## 2021-01-07 RX ADMIN — SODIUM CHLORIDE 75 MILLILITER(S): 9 INJECTION INTRAMUSCULAR; INTRAVENOUS; SUBCUTANEOUS at 13:50

## 2021-01-07 RX ADMIN — METHOCARBAMOL 750 MILLIGRAM(S): 500 TABLET, FILM COATED ORAL at 07:26

## 2021-01-07 RX ADMIN — Medication 1000 MILLIGRAM(S): at 07:24

## 2021-01-07 RX ADMIN — ONDANSETRON 4 MILLIGRAM(S): 8 TABLET, FILM COATED ORAL at 12:28

## 2021-01-07 RX ADMIN — Medication 75 MILLIGRAM(S): at 13:49

## 2021-01-07 RX ADMIN — Medication 10 MILLIGRAM(S): at 22:00

## 2021-01-07 RX ADMIN — PANTOPRAZOLE SODIUM 40 MILLIGRAM(S): 20 TABLET, DELAYED RELEASE ORAL at 07:25

## 2021-01-07 RX ADMIN — Medication 4: at 22:00

## 2021-01-07 RX ADMIN — SODIUM CHLORIDE 75 MILLILITER(S): 9 INJECTION INTRAMUSCULAR; INTRAVENOUS; SUBCUTANEOUS at 22:11

## 2021-01-07 RX ADMIN — Medication 2 MILLIGRAM(S): at 17:54

## 2021-01-07 RX ADMIN — OXYCODONE HYDROCHLORIDE 5 MILLIGRAM(S): 5 TABLET ORAL at 18:09

## 2021-01-07 NOTE — PROGRESS NOTE ADULT - ATTENDING COMMENTS
tolerated ambulation yesterday, without headaches  left hip flexor weakness resolved  notes some increased abdominal size, not tender to palpation    abdominal x-ray  discuss drains with Dr. Metz, t/c removal on low out put drains  discussed anticipated recovery process, all questions answered

## 2021-01-07 NOTE — DIETITIAN INITIAL EVALUATION ADULT. - OTHER INFO
65M with hx of DM, HTN presents with chronic back pain progressing over years. He had a laminectomy 30 years ago as well as 2 anterior cervical surgeries in 2012, and 2017. all surgeries went fine with good recovery. in december 2017 he had a L2-S1 Decompression and fusion for back and left leg pain which improved post op now s/p Posterior exploration L2-S1 osteotomy and TLIF, T10-L2 Posterior fusion 1/4    On assessment, pt resting in chair out of bed. Currently on CST CHO diet with evening snack tolerating PO well. Pt consuming >75% of meals. No reported n/v/d/c. Last BM 1/3. No abd pain or distention noted. Education provided on importance of adequate PO intake with emphasis on lean protein to support wound healing- pt receptive. Pt noting good appetite PTA. UBW is consistent with admission weight- pt denies weight changes. Allergic to shellfish. Skin: Surgical incisions, richardson score 20. Pain 4/10 back- well controlled with pain regime. Please see nutrition recs below. RD to follow

## 2021-01-07 NOTE — PROCEDURE NOTE - ADDITIONAL PROCEDURE DETAILS
left CORA drain taken off suction, site prepped with chlorhexidine, suture removed, drain removed easily, tip of catheter visualized, steristrip placed on top.   right bile bag drain site prepped with chlorhexidine, suture removed, drain removed easily and tip of catheter visualized, absorbable suture placed with clean dressing on top

## 2021-01-07 NOTE — DIETITIAN INITIAL EVALUATION ADULT. - PERTINENT LABORATORY DATA
Plasma-lyte A @ 150 ml/hr  NS @ 100 ml/hr  Insulin regime  Phos- NaK  Pain regime  Reglan  Zofran  protonix

## 2021-01-08 LAB
ANION GAP SERPL CALC-SCNC: 12 MMOL/L — SIGNIFICANT CHANGE UP (ref 5–17)
BUN SERPL-MCNC: 7 MG/DL — SIGNIFICANT CHANGE UP (ref 7–23)
CALCIUM SERPL-MCNC: 8 MG/DL — LOW (ref 8.4–10.5)
CHLORIDE SERPL-SCNC: 101 MMOL/L — SIGNIFICANT CHANGE UP (ref 96–108)
CO2 SERPL-SCNC: 25 MMOL/L — SIGNIFICANT CHANGE UP (ref 22–31)
CREAT SERPL-MCNC: 0.7 MG/DL — SIGNIFICANT CHANGE UP (ref 0.5–1.3)
GLUCOSE BLDC GLUCOMTR-MCNC: 128 MG/DL — HIGH (ref 70–99)
GLUCOSE BLDC GLUCOMTR-MCNC: 151 MG/DL — HIGH (ref 70–99)
GLUCOSE BLDC GLUCOMTR-MCNC: 156 MG/DL — HIGH (ref 70–99)
GLUCOSE BLDC GLUCOMTR-MCNC: 196 MG/DL — HIGH (ref 70–99)
GLUCOSE SERPL-MCNC: 192 MG/DL — HIGH (ref 70–99)
HCT VFR BLD CALC: 27.5 % — LOW (ref 39–50)
HGB BLD-MCNC: 8.9 G/DL — LOW (ref 13–17)
MAGNESIUM SERPL-MCNC: 1.5 MG/DL — LOW (ref 1.6–2.6)
MCHC RBC-ENTMCNC: 29.4 PG — SIGNIFICANT CHANGE UP (ref 27–34)
MCHC RBC-ENTMCNC: 32.4 GM/DL — SIGNIFICANT CHANGE UP (ref 32–36)
MCV RBC AUTO: 90.8 FL — SIGNIFICANT CHANGE UP (ref 80–100)
NRBC # BLD: 0 /100 WBCS — SIGNIFICANT CHANGE UP (ref 0–0)
PHOSPHATE SERPL-MCNC: 2.9 MG/DL — SIGNIFICANT CHANGE UP (ref 2.5–4.5)
PLATELET # BLD AUTO: 167 K/UL — SIGNIFICANT CHANGE UP (ref 150–400)
POTASSIUM SERPL-MCNC: 3.9 MMOL/L — SIGNIFICANT CHANGE UP (ref 3.5–5.3)
POTASSIUM SERPL-SCNC: 3.9 MMOL/L — SIGNIFICANT CHANGE UP (ref 3.5–5.3)
RBC # BLD: 3.03 M/UL — LOW (ref 4.2–5.8)
RBC # FLD: 13.2 % — SIGNIFICANT CHANGE UP (ref 10.3–14.5)
SODIUM SERPL-SCNC: 138 MMOL/L — SIGNIFICANT CHANGE UP (ref 135–145)
WBC # BLD: 9.71 K/UL — SIGNIFICANT CHANGE UP (ref 3.8–10.5)
WBC # FLD AUTO: 9.71 K/UL — SIGNIFICANT CHANGE UP (ref 3.8–10.5)

## 2021-01-08 RX ORDER — MAGNESIUM SULFATE 500 MG/ML
2 VIAL (ML) INJECTION ONCE
Refills: 0 | Status: COMPLETED | OUTPATIENT
Start: 2021-01-08 | End: 2021-01-08

## 2021-01-08 RX ORDER — DIAZEPAM 5 MG
5 TABLET ORAL EVERY 8 HOURS
Refills: 0 | Status: DISCONTINUED | OUTPATIENT
Start: 2021-01-08 | End: 2021-01-09

## 2021-01-08 RX ADMIN — OXYCODONE HYDROCHLORIDE 10 MILLIGRAM(S): 5 TABLET ORAL at 23:42

## 2021-01-08 RX ADMIN — Medication 1 APPLICATION(S): at 05:41

## 2021-01-08 RX ADMIN — TAMSULOSIN HYDROCHLORIDE 0.4 MILLIGRAM(S): 0.4 CAPSULE ORAL at 22:18

## 2021-01-08 RX ADMIN — SIMVASTATIN 10 MILLIGRAM(S): 20 TABLET, FILM COATED ORAL at 22:18

## 2021-01-08 RX ADMIN — Medication 2 MILLIGRAM(S): at 23:42

## 2021-01-08 RX ADMIN — Medication 10 MILLIGRAM(S): at 22:18

## 2021-01-08 RX ADMIN — OXYCODONE HYDROCHLORIDE 10 MILLIGRAM(S): 5 TABLET ORAL at 12:09

## 2021-01-08 RX ADMIN — PANTOPRAZOLE SODIUM 40 MILLIGRAM(S): 20 TABLET, DELAYED RELEASE ORAL at 05:44

## 2021-01-08 RX ADMIN — LISINOPRIL 40 MILLIGRAM(S): 2.5 TABLET ORAL at 05:42

## 2021-01-08 RX ADMIN — Medication 2: at 17:08

## 2021-01-08 RX ADMIN — Medication 2 MILLIGRAM(S): at 12:06

## 2021-01-08 RX ADMIN — OXYCODONE HYDROCHLORIDE 10 MILLIGRAM(S): 5 TABLET ORAL at 17:13

## 2021-01-08 RX ADMIN — Medication 1000 MILLIGRAM(S): at 05:42

## 2021-01-08 RX ADMIN — Medication 1000 MILLIGRAM(S): at 22:18

## 2021-01-08 RX ADMIN — METHOCARBAMOL 750 MILLIGRAM(S): 500 TABLET, FILM COATED ORAL at 05:42

## 2021-01-08 RX ADMIN — SENNA PLUS 2 TABLET(S): 8.6 TABLET ORAL at 22:18

## 2021-01-08 RX ADMIN — Medication 2 MILLIGRAM(S): at 05:42

## 2021-01-08 RX ADMIN — Medication 75 MILLIGRAM(S): at 05:42

## 2021-01-08 RX ADMIN — DULOXETINE HYDROCHLORIDE 20 MILLIGRAM(S): 30 CAPSULE, DELAYED RELEASE ORAL at 12:06

## 2021-01-08 RX ADMIN — Medication 2: at 22:18

## 2021-01-08 RX ADMIN — Medication 2 MILLIGRAM(S): at 17:08

## 2021-01-08 RX ADMIN — Medication 75 MILLIGRAM(S): at 13:59

## 2021-01-08 RX ADMIN — Medication 1 APPLICATION(S): at 17:08

## 2021-01-08 RX ADMIN — OXYCODONE HYDROCHLORIDE 10 MILLIGRAM(S): 5 TABLET ORAL at 03:32

## 2021-01-08 RX ADMIN — ENOXAPARIN SODIUM 40 MILLIGRAM(S): 100 INJECTION SUBCUTANEOUS at 22:18

## 2021-01-08 RX ADMIN — Medication 75 MILLIGRAM(S): at 22:18

## 2021-01-08 RX ADMIN — Medication 2: at 07:33

## 2021-01-08 RX ADMIN — Medication 50 GRAM(S): at 09:09

## 2021-01-08 RX ADMIN — POLYETHYLENE GLYCOL 3350 17 GRAM(S): 17 POWDER, FOR SOLUTION ORAL at 12:06

## 2021-01-08 RX ADMIN — Medication 1000 MILLIGRAM(S): at 13:59

## 2021-01-08 NOTE — DISCHARGE NOTE PROVIDER - NSDCFUADDAPPT_GEN_ALL_CORE_FT
You will follow-up with Dr. Lynne Johnson at the time their office has provided to you.   You will also follow-up with Pain Management Dr. Soni ___ You will follow-up with Dr. Lynne Johnson at the time their office has provided to you.   Call to schedule a follow up appointment with Dr. Soni in the office for next week (Pain management).  Call to schedule a follow up appointment with Dr. Metz in the office next week (Plastic surgery).

## 2021-01-08 NOTE — DISCHARGE NOTE PROVIDER - NSDCCPTREATMENT_GEN_ALL_CORE_FT
PRINCIPAL PROCEDURE  Procedure: Fusion, spine, lumbar, PLIF, with discectomy or laminectomy  Findings and Treatment:

## 2021-01-08 NOTE — OCCUPATIONAL THERAPY INITIAL EVALUATION ADULT - ADDITIONAL COMMENTS
Patient lives in a family home in [place] with 5 steps to enter home. Patient will be in [place] for the next few weeks after d/c to an elevator access apartment with no steps to enter. Patient has a raised toilet seat, RW, and shower chair at home.

## 2021-01-08 NOTE — DISCHARGE NOTE PROVIDER - CARE PROVIDER_API CALL
Yosvany Betts)  Neurosurgery  [address] [place] [address]  Phone: [phone]  Fax: [phone]  Follow Up Time:     Tate Soni  ANESTHESIOLOGY  [address] [place] [address]  Phone: [phone]  Fax: [phone]  Follow Up Time:    Yosvany Betts)  Neurosurgery  [address] [place] [address]  Phone: [phone]  Fax: [phone]  Follow Up Time:     Tate Soni)  Anesthesiology; Pain Medicine  [address] [place] [address]  Phone: [phone]  Fax: [phone]  Follow Up Time:     Herman Metz)  Plastic Surgery Surgery  [address] [place] [address]  Phone: [phone]  Fax: [phone]  Follow Up Time:

## 2021-01-08 NOTE — OCCUPATIONAL THERAPY INITIAL EVALUATION ADULT - MD ORDER
Patient presents with chronic back pain progressing over years. He had a laminectomy 30 years ago as well as 2 anterior cervical surgeries in 2012, and 2017. all surgeries went fine with good recovery. in december 2017 he had a L2-S1 Decompression and fusion for back and left leg pain which improved post op. Elective Posterior exploration L2-S1 osteotomy and TLIF, T10-L2 Posterior fusion on 01/05/21.

## 2021-01-08 NOTE — DISCHARGE NOTE PROVIDER - NSDCMRMEDTOKEN_GEN_ALL_CORE_FT
DULoxetine 20 mg oral delayed release capsule: 1 cap(s) orally once a day (at bedtime)  Januvia 100 mg oral tablet: 1 tab(s) orally once a day  metFORMIN: 2000 milligram(s) orally once a day  PriLOSEC 40 mg oral delayed release capsule: 1 cap(s) orally once a day  Trulicity Pen 1.5 mg/0.5 mL subcutaneous solution: subcutaneous every 7 days  Zocor 10 mg oral tablet: 1 tab(s) orally once a day (at bedtime)   acetaminophen 500 mg oral tablet: 2 tab(s) orally every 8 hours  diazePAM 5 mg oral tablet: 1 tab(s) orally every 8 hours, As needed, muscle spasm MDD:3 tabs daily  DULoxetine 20 mg oral delayed release capsule: 1 cap(s) orally once a day (at bedtime)  Januvia 100 mg oral tablet: 1 tab(s) orally once a day  metFORMIN: 2000 milligram(s) orally once a day  oxyCODONE 10 mg oral tablet: 1 tab(s) orally every 6 hours, As Needed -Severe Pain (7 - 10) MDD:4 tabs daily  pregabalin 75 mg oral capsule: 1 cap(s) orally every 8 hours MDD:3 tabs daily  PriLOSEC 40 mg oral delayed release capsule: 1 cap(s) orally once a day  senna oral tablet: 2 tab(s) orally once a day (at bedtime)  Trulicity Pen 1.5 mg/0.5 mL subcutaneous solution: subcutaneous every 7 days  Zocor 10 mg oral tablet: 1 tab(s) orally once a day (at bedtime)

## 2021-01-08 NOTE — DISCHARGE NOTE PROVIDER - HOSPITAL COURSE
HPI:  patient presents with chronic back pain progressing over years. He had a laminectomy 30 years ago as well as 2 anterior cervical surgeries in 2012, and 2017. all surgeries went fine with good recovery. in december 2017 he had a L2-S1 Decompression and fusion for back and left leg pain which improved post op. (04 Jan 2021 15:04)    1/4: POD0 s/p Posterior exploration L2-S1 osteotomy and TLIF, T10-L2 Posterior fusion complicated by durotomy, wrapped with duraseal, Dr. Metz plastics closure  1/5: POD1, OLGA overnight, neuro stable, lay flat until tomorrow, TLSO at bedside  1/6: POD2, OLGA overnight, neuro stable, pain well controlled, raise HOB and d/c park today  1/7: POD #3 uneventful night  LLE improving   Abdomen slightly distneded + BS non tender will obtain abdominal xray  Needs BM will order laxatives  drains X4  Pain Managment  Continue OT/PT  1/8: POD #4: concern for ileus, remains on clears, flomax resumed, monitoring drain output   HPI:  patient presents with chronic back pain progressing over years. He had a laminectomy 30 years ago as well as 2 anterior cervical surgeries in 2012, and 2017. all surgeries went fine with good recovery. in december 2017 he had a L2-S1 Decompression and fusion for back and left leg pain which improved post op. (04 Jan 2021 15:04)    1/4: POD0 s/p Posterior exploration L2-S1 osteotomy and TLIF, T10-L2 Posterior fusion complicated by durotomy, wrapped with duraseal, Dr. Metz plastics closure  1/5: POD1, OLGA overnight, neuro stable, lay flat until tomorrow, TLSO at bedside  1/6: POD2, OLGA overnight, neuro stable, pain well controlled, raise HOB and d/c park today  1/7: POD3, uneventful night  LLE improving   Abdomen slightly distneded + BS non tender will obtain abdominal xray  Needs BM will order laxatives  drains X4  Pain Managment  Continue OT/PT  1/8: POD4, concern for ileus, remains on clears, flomax resumed, monitoring drain output  1/9: POD5, OLGA overnight. +BM this am, tolerating regular diet.     Patient worked with PT/OT and is recommended for discharge home with [facility].  Patient medically optimized for discharge now.

## 2021-01-08 NOTE — PROGRESS NOTE ADULT - ASSESSMENT
A/P: Pt doing well without complication. POD#2  1. Change dressings and replace telfa/tegaderm every other day  2. HV drain may be removed based on neurosurgery criteria.   3. Maintain CORA drain until HV is out and CORA is less than 20mL/24 hours
A/P: Pt doing well without complication. POD#4  1. Change dressings and replace telfa/tegaderm every other day  2. Remaining HV drain may be removed    3. Maintain CORA drain until HV is out and CORA is less than 20mL/24 hours. I can also remove it in the office next week.

## 2021-01-08 NOTE — DISCHARGE NOTE PROVIDER - PROVIDER TOKENS
PROVIDER:[TOKEN:[[id number]]],PROVIDER:[TOKEN:[[id number]]] PROVIDER:[TOKEN:[[id number]]],PROVIDER:[TOKEN:[[id number]]],PROVIDER:[TOKEN:[[id number]]]

## 2021-01-08 NOTE — OCCUPATIONAL THERAPY INITIAL EVALUATION ADULT - LOWER BODY DRESSING, ASSISTIVE DEVICE, OT EVAL
Patient introduced hip kit to assist with performing LB dressing and patient refused. Wife and daughter will assist when at home.

## 2021-01-08 NOTE — OCCUPATIONAL THERAPY INITIAL EVALUATION ADULT - GENERAL OBSERVATIONS, REHAB EVAL
Patient A&Ox4, agreeable, received in supine position in bed + IV, CORA drain, + spinal incision bandage. POD # 4

## 2021-01-08 NOTE — OCCUPATIONAL THERAPY INITIAL EVALUATION ADULT - MANUAL MUSCLE TESTING RESULTS, REHAB EVAL
based on functional performance of ADL's, transfers and ambulation with RW/no strength deficits were identified

## 2021-01-08 NOTE — OCCUPATIONAL THERAPY INITIAL EVALUATION ADULT - DIAGNOSIS, OT EVAL
Patient presented with decreased activity tolerance, core strength, trunk control impacting independence with functional activities 2/2 s/p L2-S1 osteotomy and T10-L2 posterior fusion.

## 2021-01-08 NOTE — OCCUPATIONAL THERAPY INITIAL EVALUATION ADULT - PLANNED THERAPY INTERVENTIONS, OT EVAL
ADL retraining/IADL retraining/balance training/bed mobility training/motor coordination training/strengthening/transfer training

## 2021-01-09 VITALS
DIASTOLIC BLOOD PRESSURE: 64 MMHG | HEART RATE: 80 BPM | RESPIRATION RATE: 18 BRPM | SYSTOLIC BLOOD PRESSURE: 148 MMHG | OXYGEN SATURATION: 97 % | TEMPERATURE: 99 F

## 2021-01-09 LAB
ANION GAP SERPL CALC-SCNC: 9 MMOL/L — SIGNIFICANT CHANGE UP (ref 5–17)
BUN SERPL-MCNC: 5 MG/DL — LOW (ref 7–23)
CALCIUM SERPL-MCNC: 8.4 MG/DL — SIGNIFICANT CHANGE UP (ref 8.4–10.5)
CHLORIDE SERPL-SCNC: 100 MMOL/L — SIGNIFICANT CHANGE UP (ref 96–108)
CO2 SERPL-SCNC: 27 MMOL/L — SIGNIFICANT CHANGE UP (ref 22–31)
CREAT SERPL-MCNC: 0.63 MG/DL — SIGNIFICANT CHANGE UP (ref 0.5–1.3)
GLUCOSE BLDC GLUCOMTR-MCNC: 140 MG/DL — HIGH (ref 70–99)
GLUCOSE BLDC GLUCOMTR-MCNC: 159 MG/DL — HIGH (ref 70–99)
GLUCOSE SERPL-MCNC: 177 MG/DL — HIGH (ref 70–99)
HCT VFR BLD CALC: 27.6 % — LOW (ref 39–50)
HGB BLD-MCNC: 8.8 G/DL — LOW (ref 13–17)
MAGNESIUM SERPL-MCNC: 1.6 MG/DL — SIGNIFICANT CHANGE UP (ref 1.6–2.6)
MCHC RBC-ENTMCNC: 29.1 PG — SIGNIFICANT CHANGE UP (ref 27–34)
MCHC RBC-ENTMCNC: 31.9 GM/DL — LOW (ref 32–36)
MCV RBC AUTO: 91.4 FL — SIGNIFICANT CHANGE UP (ref 80–100)
NRBC # BLD: 0 /100 WBCS — SIGNIFICANT CHANGE UP (ref 0–0)
PHOSPHATE SERPL-MCNC: 4 MG/DL — SIGNIFICANT CHANGE UP (ref 2.5–4.5)
PLATELET # BLD AUTO: 199 K/UL — SIGNIFICANT CHANGE UP (ref 150–400)
POTASSIUM SERPL-MCNC: 3.9 MMOL/L — SIGNIFICANT CHANGE UP (ref 3.5–5.3)
POTASSIUM SERPL-SCNC: 3.9 MMOL/L — SIGNIFICANT CHANGE UP (ref 3.5–5.3)
RBC # BLD: 3.02 M/UL — LOW (ref 4.2–5.8)
RBC # FLD: 13.3 % — SIGNIFICANT CHANGE UP (ref 10.3–14.5)
SODIUM SERPL-SCNC: 136 MMOL/L — SIGNIFICANT CHANGE UP (ref 135–145)
WBC # BLD: 5.91 K/UL — SIGNIFICANT CHANGE UP (ref 3.8–10.5)
WBC # FLD AUTO: 5.91 K/UL — SIGNIFICANT CHANGE UP (ref 3.8–10.5)

## 2021-01-09 PROCEDURE — 86803 HEPATITIS C AB TEST: CPT

## 2021-01-09 PROCEDURE — 84100 ASSAY OF PHOSPHORUS: CPT

## 2021-01-09 PROCEDURE — 82962 GLUCOSE BLOOD TEST: CPT

## 2021-01-09 PROCEDURE — 86901 BLOOD TYPING SEROLOGIC RH(D): CPT

## 2021-01-09 PROCEDURE — 86850 RBC ANTIBODY SCREEN: CPT

## 2021-01-09 PROCEDURE — 71045 X-RAY EXAM CHEST 1 VIEW: CPT

## 2021-01-09 PROCEDURE — 76000 FLUOROSCOPY <1 HR PHYS/QHP: CPT

## 2021-01-09 PROCEDURE — 80053 COMPREHEN METABOLIC PANEL: CPT

## 2021-01-09 PROCEDURE — 86923 COMPATIBILITY TEST ELECTRIC: CPT

## 2021-01-09 PROCEDURE — 97116 GAIT TRAINING THERAPY: CPT

## 2021-01-09 PROCEDURE — 97535 SELF CARE MNGMENT TRAINING: CPT

## 2021-01-09 PROCEDURE — C1769: CPT

## 2021-01-09 PROCEDURE — 85027 COMPLETE CBC AUTOMATED: CPT

## 2021-01-09 PROCEDURE — 83036 HEMOGLOBIN GLYCOSYLATED A1C: CPT

## 2021-01-09 PROCEDURE — P9045: CPT

## 2021-01-09 PROCEDURE — C1713: CPT

## 2021-01-09 PROCEDURE — 97110 THERAPEUTIC EXERCISES: CPT

## 2021-01-09 PROCEDURE — 80048 BASIC METABOLIC PNL TOTAL CA: CPT

## 2021-01-09 PROCEDURE — 97530 THERAPEUTIC ACTIVITIES: CPT

## 2021-01-09 PROCEDURE — 36430 TRANSFUSION BLD/BLD COMPNT: CPT

## 2021-01-09 PROCEDURE — 83735 ASSAY OF MAGNESIUM: CPT

## 2021-01-09 PROCEDURE — 86900 BLOOD TYPING SEROLOGIC ABO: CPT

## 2021-01-09 PROCEDURE — 74018 RADEX ABDOMEN 1 VIEW: CPT

## 2021-01-09 PROCEDURE — P9016: CPT

## 2021-01-09 PROCEDURE — 99232 SBSQ HOSP IP/OBS MODERATE 35: CPT

## 2021-01-09 PROCEDURE — 97161 PT EVAL LOW COMPLEX 20 MIN: CPT

## 2021-01-09 PROCEDURE — 87040 BLOOD CULTURE FOR BACTERIA: CPT

## 2021-01-09 PROCEDURE — 36415 COLL VENOUS BLD VENIPUNCTURE: CPT

## 2021-01-09 PROCEDURE — C1889: CPT

## 2021-01-09 PROCEDURE — 81001 URINALYSIS AUTO W/SCOPE: CPT

## 2021-01-09 PROCEDURE — 72020 X-RAY EXAM OF SPINE 1 VIEW: CPT

## 2021-01-09 RX ORDER — LACTULOSE 10 G/15ML
10 SOLUTION ORAL ONCE
Refills: 0 | Status: COMPLETED | OUTPATIENT
Start: 2021-01-09 | End: 2021-01-09

## 2021-01-09 RX ORDER — SENNA PLUS 8.6 MG/1
2 TABLET ORAL
Qty: 0 | Refills: 0 | DISCHARGE
Start: 2021-01-09

## 2021-01-09 RX ORDER — OXYCODONE HYDROCHLORIDE 5 MG/1
1 TABLET ORAL
Qty: 28 | Refills: 0
Start: 2021-01-09 | End: 2021-01-15

## 2021-01-09 RX ORDER — DIAZEPAM 5 MG
1 TABLET ORAL
Qty: 21 | Refills: 0
Start: 2021-01-09 | End: 2021-01-15

## 2021-01-09 RX ORDER — ACETAMINOPHEN 500 MG
2 TABLET ORAL
Qty: 0 | Refills: 0 | DISCHARGE
Start: 2021-01-09

## 2021-01-09 RX ADMIN — DULOXETINE HYDROCHLORIDE 20 MILLIGRAM(S): 30 CAPSULE, DELAYED RELEASE ORAL at 12:37

## 2021-01-09 RX ADMIN — Medication 5 MILLIGRAM(S): at 07:15

## 2021-01-09 RX ADMIN — Medication 2 MILLIGRAM(S): at 12:38

## 2021-01-09 RX ADMIN — Medication 2 MILLIGRAM(S): at 06:08

## 2021-01-09 RX ADMIN — Medication 75 MILLIGRAM(S): at 06:08

## 2021-01-09 RX ADMIN — Medication 10 MILLIGRAM(S): at 10:21

## 2021-01-09 RX ADMIN — Medication 2: at 07:19

## 2021-01-09 RX ADMIN — LACTULOSE 10 GRAM(S): 10 SOLUTION ORAL at 10:38

## 2021-01-09 RX ADMIN — Medication 1000 MILLIGRAM(S): at 13:41

## 2021-01-09 RX ADMIN — Medication 1 APPLICATION(S): at 06:08

## 2021-01-09 RX ADMIN — PANTOPRAZOLE SODIUM 40 MILLIGRAM(S): 20 TABLET, DELAYED RELEASE ORAL at 06:08

## 2021-01-09 RX ADMIN — Medication 1000 MILLIGRAM(S): at 06:08

## 2021-01-09 RX ADMIN — Medication 75 MILLIGRAM(S): at 13:41

## 2021-01-09 RX ADMIN — LISINOPRIL 40 MILLIGRAM(S): 2.5 TABLET ORAL at 06:08

## 2021-01-09 NOTE — PROGRESS NOTE ADULT - REASON FOR ADMISSION
elective T10-Pelvis revisional decompression fusion

## 2021-01-09 NOTE — PROGRESS NOTE ADULT - PROVIDER SPECIALTY LIST ADULT
Neurosurgery
Pain Medicine
Neurosurgery
Pain Medicine
Pain Medicine
Plastic Surgery
Neurosurgery
Neurosurgery
Plastic Surgery

## 2021-01-09 NOTE — PROGRESS NOTE ADULT - SUBJECTIVE AND OBJECTIVE BOX
HPI:  patient presents with chronic back pain progressing over years. He had a laminectomy 30 years ago as well as 2 anterior cervical surgeries in 2012, and 2017. all surgeries went fine with good recovery. in december 2017 he had a L2-S1 Decompression and fusion for back and left leg pain which improved post op. (04 Jan 2021 15:04)    1/4: POD0 s/p Posterior exploration L2-S1 osteotomy and TLIF, T10-L2 Posterior fusion complicated by durotomy, wrapped with duraseal, Dr. Metz plastics closure  1/5: POD1, OLGA overnight, neuro stable, lay flat until tomorrow, TLSO at bedside  1/6: POD2, OLGA overnight, neuro stable, pain well controlled, raise HOB and d/c park today  1/7: POD #3 uneventful night  LLE improving   Abdomen slightly distneded + BS non tender will obtain abdominal xray  Needs BM will order laxatives  drains X4  Pain Managment  Continue OT/PT  1/8: POD #4: concern for ileus, remains on clears, flomax resumed, monitoring drain output  1/9 POD #5 Pt remains on clear, passing flatus but no BM yet. Exam stable.      Vital Signs Last 24 Hrs  T(C): 36.9 (08 Jan 2021 21:57), Max: 37.4 (08 Jan 2021 15:42)  T(F): 98.4 (08 Jan 2021 21:57), Max: 99.4 (08 Jan 2021 15:42)  HR: 69 (08 Jan 2021 21:57) (69 - 82)  BP: 133/69 (08 Jan 2021 21:57) (102/65 - 133/72)  BP(mean): --  RR: 17 (08 Jan 2021 21:57) (17 - 19)  SpO2: 91% (08 Jan 2021 21:57) (91% - 98%)    I&O's Detail    07 Jan 2021 07:01  -  08 Jan 2021 07:00  --------------------------------------------------------  IN:    sodium chloride 0.9%: 1125 mL  Total IN: 1125 mL    OUT:    Bulb (mL): 20 mL    Drain (mL): 0 mL    Intermittent Catheterization - Urethral (mL): 500 mL    Voided (mL): 1100 mL  Total OUT: 1620 mL    Total NET: -495 mL      08 Jan 2021 07:01  -  09 Jan 2021 03:17  --------------------------------------------------------  IN:    sodium chloride 0.9%: 525 mL  Total IN: 525 mL    OUT:    Bulb (mL): 10 mL    Voided (mL): 1200 mL  Total OUT: 1210 mL    Total NET: -685 mL        I&O's Summary    07 Jan 2021 07:01  -  08 Jan 2021 07:00  --------------------------------------------------------  IN: 1125 mL / OUT: 1620 mL / NET: -495 mL    08 Jan 2021 07:01  -  09 Jan 2021 03:17  --------------------------------------------------------  IN: 525 mL / OUT: 1210 mL / NET: -685 mL        PHYSICAL EXAM:  General: patient seen laying supine in bed in NAD  Neuro: AAOx3, FC, OE spontaneously, speech clear and fluent, face symmetric, strength 5/5 b/l UE and LE, SILT throughout  HEENT: PERRL, EOMI  Neck: supple  Cardiac: RRR, S1S2  Pulmonary: chest rise symmetric  Abdomen: distended +BS nontender  Ext: warm, perfusing well  Back: incision c/d/i         TUBES/LINES:  [] CVC  [] A-line  [] Lumbar Drain  [] Ventriculostomy  [] Other    DIET:  [] NPO  [] Mechanical  [] Tube feeds    LABS:                        8.9    9.71  )-----------( 167      ( 08 Jan 2021 06:28 )             27.5     01-08    138  |  101  |  7   ----------------------------<  192<H>  3.9   |  25  |  0.70    Ca    8.0<L>      08 Jan 2021 06:28  Phos  2.9     01-08  Mg     1.5     01-08    TPro  6.0  /  Alb  3.3  /  TBili  0.5  /  DBili  x   /  AST  16  /  ALT  12  /  AlkPhos  67  01-07            CAPILLARY BLOOD GLUCOSE      POCT Blood Glucose.: 151 mg/dL (08 Jan 2021 21:42)  POCT Blood Glucose.: 156 mg/dL (08 Jan 2021 17:06)  POCT Blood Glucose.: 128 mg/dL (08 Jan 2021 12:52)  POCT Blood Glucose.: 196 mg/dL (08 Jan 2021 07:22)      Drug Levels: [] N/A    CSF Analysis: [] N/A      Allergies    No Known Drug Allergies  shellfish (Rash)    Intolerances      MEDICATIONS:  Antibiotics:    Neuro:  acetaminophen   Tablet .. 1000 milliGRAM(s) Oral every 8 hours  diazepam    Tablet 5 milliGRAM(s) Oral every 8 hours PRN  DULoxetine 20 milliGRAM(s) Oral daily  metoclopramide Injectable 10 milliGRAM(s) IV Push every 8 hours PRN  ondansetron Injectable 4 milliGRAM(s) IV Push every 6 hours  oxyCODONE    IR 10 milliGRAM(s) Oral every 4 hours PRN  oxyCODONE    IR 5 milliGRAM(s) Oral every 4 hours PRN  pregabalin 75 milliGRAM(s) Oral every 8 hours    Anticoagulation:  enoxaparin Injectable 40 milliGRAM(s) SubCutaneous at bedtime    OTHER:  BACItracin   Ointment 1 Application(s) Topical two times a day  bisacodyl 10 milliGRAM(s) Oral at bedtime  BUpivacaine liposome 1.3% Injectable (no eMAR) 20 milliLiter(s) Local Injection once  dextrose 40% Gel 15 Gram(s) Oral once  dextrose 50% Injectable 25 Gram(s) IV Push once  dextrose 50% Injectable 12.5 Gram(s) IV Push once  dextrose 50% Injectable 25 Gram(s) IV Push once  enalaprilat Injectable 1.25 milliGRAM(s) IV Push every 6 hours PRN  glucagon  Injectable 1 milliGRAM(s) IntraMuscular once  hydrALAZINE Injectable 10 milliGRAM(s) IV Push every 2 hours PRN  insulin lispro (ADMELOG) corrective regimen sliding scale   SubCutaneous Before meals and at bedtime  lisinopril 40 milliGRAM(s) Oral daily  nicotine  Polacrilex Gum 2 milliGRAM(s) Oral four times a day  pantoprazole    Tablet 40 milliGRAM(s) Oral before breakfast  polyethylene glycol 3350 17 Gram(s) Oral daily  senna 2 Tablet(s) Oral at bedtime  simvastatin 10 milliGRAM(s) Oral at bedtime  tamsulosin 0.4 milliGRAM(s) Oral at bedtime    IVF:  dextrose 5%. 1000 milliLiter(s) IV Continuous <Continuous>  dextrose 5%. 1000 milliLiter(s) IV Continuous <Continuous>  sodium chloride 0.9%. 1000 milliLiter(s) IV Continuous <Continuous>    CULTURES:  Culture Results:   No growth at 2 days. (01-06 @ 17:14)  Culture Results:   No growth at 2 days. (01-06 @ 17:14)    RADIOLOGY & ADDITIONAL TESTS:      ASSESSMENT:  65y Male s/p patient presents with chronic back pain progressing over years. He had a laminectomy 30 years ago as well as 2 anterior cervical surgeries in 2012, and 2017. all surgeries went fine with good recovery. in december 2017 he had a L2-S1 Decompression and fusion for back and left leg pain which improved post op, s/p Posterior exploration L2-S1 osteotomy and TLIF, T10-L2 Posterior fusion (1/4) POD#5      Z98.1    Handoff    MEWS Score    Back pain    DM (diabetes mellitus)    HTN (hypertension)    Lumbar spinal fusion    Chronic bilateral back pain, unspecified back location    Fusion, spine, lumbar, TLIF, complex    Fusion, spine, lumbar, PLIF, with discectomy or laminectomy    H/O laminectomy    Surgery, elective    Surgery, elective    DM (diabetes mellitus)    SysAdmin_VstLnk        PLAN:  NEURO:  Monitor neuro status  OT/PT  Pain Managment  Drains removed    CARDIOVASCULAR:  stable  on home meds    PULMONARY:  Encourage OOB  Incentive Spirometry    RENAL:  trend UOP  straight cath prn  cont flomax    GI:  KUB c/w ileus  clears only  bowel regimen    ID:  afeb  no abx    DVT PROPHYLAXIS:  [x] Venodynes                                [x] Heparin/Lovenox    d/w Dr. Betts  full code  dispo pending         Assessment:  Present when checked    []  GCS  E   V  M     Heart Failure: []Acute, [] acute on chronic , []chronic  Heart Failure:  [] Diastolic (HFpEF), [] Systolic (HFrEF), []Combined (HFpEF and HFrEF), [] RHF, [] Pulm HTN, [] Other    [] KRYSTAL, [] ATN, [] AIN, [] other  [] CKD1, [] CKD2, [] CKD 3, [] CKD 4, [] CKD 5, []ESRD    Encephalopathy: [] Metabolic, [] Hepatic, [] toxic, [] Neurological, [] Other    Abnormal Nurtitional Status: [] malnurtition (see nutrition note), [ ]underweight: BMI < 19, [] morbid obesity: BMI >40, [] Cachexia    [] Sepsis  [] hypovolemic shock,[] cardiogenic shock, [] hemorrhagic shock, [] neuogenic shock  [] Acute Respiratory Failure  []Cerebral edema, [] Brain compression/ herniation,   [] Functional quadriplegia  [] Acute blood loss anemia  
Pt seen and examined.     Exam:  Incision c/d/i. No infection or separation.   Dressings changed. Prineo in place.   Drains: 60 (CORA), 35 (HV) in 24 hours    
HPI:  patient presents with chronic back pain progressing over years. He had a laminectomy 30 years ago as well as 2 anterior cervical surgeries in 2012, and 2017. all surgeries went fine with good recovery. in december 2017 he had a L2-S1 Decompression and fusion for back and left leg pain which improved post op. (04 Jan 2021 15:04)    Hospital course:  1/4: POD0 s/p Posterior exploration L2-S1 osteotomy and TLIF, T10-L2 Posterior fusion complicated by durotomy, wrapped with duraseal, Dr. Metz plastics closure  1/5: POD1, OLGA overnight, neuro stable    Vital Signs Last 24 Hrs  T(C): 36.8 (04 Jan 2021 21:46), Max: 37.2 (04 Jan 2021 14:00)  T(F): 98.3 (04 Jan 2021 21:46), Max: 98.9 (04 Jan 2021 14:00)  HR: 84 (04 Jan 2021 21:46) (69 - 90)  BP: 150/68 (04 Jan 2021 21:46) (147/67 - 182/88)  BP(mean): 96 (04 Jan 2021 21:00) (96 - 127)  RR: 17 (04 Jan 2021 21:46) (7 - 21)  SpO2: 98% (04 Jan 2021 21:46) (97% - 100%)    I&O's Summary    04 Jan 2021 07:01  -  05 Jan 2021 01:54  --------------------------------------------------------  IN: 800 mL / OUT: 985 mL / NET: -185 mL        PHYSICAL EXAM:  General: patient seen laying supine in bed in NAD  Neuro: AAOx3, FC, OE spontaneously, speech clear and fluent, face symmetric, strength 5/5 b/l UE and LE, SILT throughout  HEENT: PERRL, EOMI  Neck: supple  Cardiac: RRR, S1S2  Pulmonary: chest rise symmetric  Abdomen: soft, nontender, nondistended  Ext: warm, perfusing well  Back: incision c/d/i, 2 x Bile bags , 2 x JPs        TUBES/LINES:  [x] Park  [] Lumbar Drain  [x] Wound Drains - deep bile bags x 2, superficial CORA x 2  [] Others      DIET:  [] NPO  [x] Mechanical - clears  [] Tube feeds    LABS:                        11.1   10.54 )-----------( 166      ( 04 Jan 2021 14:45 )             33.6     01-04    140  |  103  |  15  ----------------------------<  281<H>  4.0   |  22  |  0.70    Ca    8.1<L>      04 Jan 2021 14:45  Phos  2.7     01-04  Mg     2.0     01-04              CAPILLARY BLOOD GLUCOSE      POCT Blood Glucose.: 225 mg/dL (04 Jan 2021 22:29)  POCT Blood Glucose.: 211 mg/dL (04 Jan 2021 20:20)  POCT Blood Glucose.: 277 mg/dL (04 Jan 2021 14:20)  POCT Blood Glucose.: 161 mg/dL (04 Jan 2021 06:25)      Drug Levels: [] N/A    CSF Analysis: [] N/A      Allergies    No Known Drug Allergies  shellfish (Rash)    Intolerances      MEDICATIONS:  Antibiotics:  ceFAZolin   IVPB 2000 milliGRAM(s) IV Intermittent every 8 hours    Neuro:  acetaminophen   Tablet .. 1000 milliGRAM(s) Oral every 8 hours  DULoxetine 20 milliGRAM(s) Oral daily  methocarbamol 500 milliGRAM(s) Oral every 8 hours  metoclopramide Injectable 10 milliGRAM(s) IV Push every 8 hours PRN  ondansetron Injectable 4 milliGRAM(s) IV Push every 6 hours  oxyCODONE    IR 5 milliGRAM(s) Oral four times a day PRN  pregabalin 50 milliGRAM(s) Oral three times a day    Anticoagulation:  enoxaparin Injectable 40 milliGRAM(s) SubCutaneous at bedtime    OTHER:  BUpivacaine liposome 1.3% Injectable (no eMAR) 20 milliLiter(s) Local Injection once  dextrose 40% Gel 15 Gram(s) Oral once  dextrose 50% Injectable 25 Gram(s) IV Push once  dextrose 50% Injectable 12.5 Gram(s) IV Push once  dextrose 50% Injectable 25 Gram(s) IV Push once  enalaprilat Injectable 1.25 milliGRAM(s) IV Push every 6 hours PRN  glucagon  Injectable 1 milliGRAM(s) IntraMuscular once  hydrALAZINE Injectable 10 milliGRAM(s) IV Push every 2 hours PRN  insulin lispro (ADMELOG) corrective regimen sliding scale   SubCutaneous Before meals and at bedtime  nicotine  Polacrilex Gum 2 milliGRAM(s) Oral four times a day  pantoprazole    Tablet 40 milliGRAM(s) Oral before breakfast  senna 2 Tablet(s) Oral at bedtime  simvastatin 10 milliGRAM(s) Oral at bedtime    IVF:  dextrose 5%. 1000 milliLiter(s) IV Continuous <Continuous>  dextrose 5%. 1000 milliLiter(s) IV Continuous <Continuous>  multiple electrolytes Injection Type 1 1000 milliLiter(s) IV Continuous <Continuous>  sodium chloride 0.9%. 1000 milliLiter(s) IV Continuous <Continuous>    CULTURES:    RADIOLOGY & ADDITIONAL TESTS:      ASSESSMENT:  65y Male s/p patient presents with chronic back pain progressing over years. He had a laminectomy 30 years ago as well as 2 anterior cervical surgeries in 2012, and 2017. all surgeries went fine with good recovery. in december 2017 he had a L2-S1 Decompression and fusion for back and left leg pain which improved post op, s/p Posterior exploration L2-S1 osteotomy and TLIF, T10-L2 Posterior fusion (1/4) POD#1      Z98.1    Handoff    Back pain    DM (diabetes mellitus)    HTN (hypertension)    Chronic bilateral back pain, unspecified back location    Fusion, spine, lumbar, TLIF, complex    Fusion, spine, lumbar, PLIF, with discectomy or laminectomy    H/O laminectomy    Surgery, elective    Surgery, elective    DM (diabetes mellitus)    SysAdmin_VstLnk        PLAN:  - monitor CORA x 2 and Bile Bag x 2 output  - HOB flat until Wednesday, with diet privileges HOB 15  - park in until Wed while flat  - Pain control  - neuro/vital checks  - SCDs, IS, Bowel Regimen  - TLSO ordered from [facility]  - trend H/H, s/p transfusion ( 1/4) intra-op  - PT/OT on hold until bedrest is lifted      D/w Dr. Betts    Assessment:  Present when checked    []  GCS  E   V  M     Heart Failure: []Acute, [] acute on chronic , []chronic  Heart Failure:  [] Diastolic (HFpEF), [] Systolic (HFrEF), []Combined (HFpEF and HFrEF), [] RHF, [] Pulm HTN, [] Other    [] KRYSTAL, [] ATN, [] AIN, [] other  [] CKD1, [] CKD2, [] CKD 3, [] CKD 4, [] CKD 5, []ESRD    Encephalopathy: [] Metabolic, [] Hepatic, [] toxic, [] Neurological, [] Other    Abnormal Nurtitional Status: [] malnurtition (see nutrition note), [ ]underweight: BMI < 19, [] morbid obesity: BMI >40, [] Cachexia    [] Sepsis  [] hypovolemic shock,[] cardiogenic shock, [] hemorrhagic shock, [] neuogenic shock  [] Acute Respiratory Failure  []Cerebral edema, [] Brain compression/ herniation,   [] Functional quadriplegia  [] Acute blood loss anemia  
HPI:  patient presents with chronic back pain progressing over years. He had a laminectomy 30 years ago as well as 2 anterior cervical surgeries in 2012, and 2017. all surgeries went fine with good recovery. in december 2017 he had a L2-S1 Decompression and fusion for back and left leg pain which improved post op. (04 Jan 2021 15:04)    Hospital course:  1/4: POD0 s/p Posterior exploration L2-S1 osteotomy and TLIF, T10-L2 Posterior fusion complicated by durotomy, wrapped with duraseal, Dr. Metz plastics closure  1/5: POD1, OLGA overnight, neuro stable, patient describes tightness in the lower back wrapping around the hips  1/8: POD 4. Patient sitting in chair.  Pain better controlled.  Continues to have tightness in the lower back and wrapping around the hips.    Home Medications:  DULoxetine 20 mg oral delayed release capsule: 1 cap(s) orally once a day (at bedtime) (04 Jan 2021 06:38)  Januvia 100 mg oral tablet: 1 tab(s) orally once a day (04 Jan 2021 06:38)  metFORMIN: 2000 milligram(s) orally once a day (04 Jan 2021 06:38)  PriLOSEC 40 mg oral delayed release capsule: 1 cap(s) orally once a day (04 Jan 2021 06:38)  Trulicity Pen 1.5 mg/0.5 mL subcutaneous solution: subcutaneous every 7 days (04 Jan 2021 06:38)  Zocor 10 mg oral tablet: 1 tab(s) orally once a day (at bedtime) (04 Jan 2021 06:38)    Vital Signs Last 24 Hrs  T(C): 37.2 (08 Jan 2021 06:40), Max: 37.6 (07 Jan 2021 13:20)  T(F): 98.9 (08 Jan 2021 06:40), Max: 99.6 (07 Jan 2021 13:20)  HR: 79 (08 Jan 2021 06:40) (74 - 97)  BP: 123/58 (08 Jan 2021 06:40) (119/64 - 171/91)  BP(mean): --  RR: 18 (08 Jan 2021 06:40) (16 - 19)  SpO2: 98% (08 Jan 2021 06:40) (95% - 98%)    PHYSICAL EXAM:  General: patient seen laying supine in bed in NAD  Neuro: AAOx3, FC, OE spontaneously, speech clear and fluent, face symmetric, strength 5/5 b/l UE and LE, SILT throughout  HEENT: PERRL, EOMI  Neck: supple  Cardiac: RRR, S1S2  Pulmonary: chest rise symmetric  Abdomen: soft, nontender, nondistended  Ext: warm, perfusing well  Back: incision c/d/i    Drug Levels: [] N/A    CSF Analysis: [] N/A      Allergies    No Known Drug Allergies  shellfish (Rash)    Intolerances    MEDICATIONS  (STANDING):  acetaminophen   Tablet .. 1000 milliGRAM(s) Oral every 8 hours  BACItracin   Ointment 1 Application(s) Topical two times a day  bisacodyl 10 milliGRAM(s) Oral at bedtime  BUpivacaine liposome 1.3% Injectable (no eMAR) 20 milliLiter(s) Local Injection once  dextrose 40% Gel 15 Gram(s) Oral once  dextrose 5%. 1000 milliLiter(s) (50 mL/Hr) IV Continuous <Continuous>  dextrose 5%. 1000 milliLiter(s) (100 mL/Hr) IV Continuous <Continuous>  dextrose 50% Injectable 25 Gram(s) IV Push once  dextrose 50% Injectable 12.5 Gram(s) IV Push once  dextrose 50% Injectable 25 Gram(s) IV Push once  DULoxetine 20 milliGRAM(s) Oral daily  enoxaparin Injectable 40 milliGRAM(s) SubCutaneous at bedtime  glucagon  Injectable 1 milliGRAM(s) IntraMuscular once  insulin lispro (ADMELOG) corrective regimen sliding scale   SubCutaneous Before meals and at bedtime  lisinopril 40 milliGRAM(s) Oral daily  magnesium sulfate  IVPB 2 Gram(s) IV Intermittent once  methocarbamol 750 milliGRAM(s) Oral every 8 hours  nicotine  Polacrilex Gum 2 milliGRAM(s) Oral four times a day  ondansetron Injectable 4 milliGRAM(s) IV Push every 6 hours  pantoprazole    Tablet 40 milliGRAM(s) Oral before breakfast  polyethylene glycol 3350 17 Gram(s) Oral daily  pregabalin 75 milliGRAM(s) Oral every 8 hours  senna 2 Tablet(s) Oral at bedtime  simvastatin 10 milliGRAM(s) Oral at bedtime  sodium chloride 0.9%. 1000 milliLiter(s) (75 mL/Hr) IV Continuous <Continuous>  tamsulosin 0.4 milliGRAM(s) Oral at bedtime    MEDICATIONS  (PRN):  enalaprilat Injectable 1.25 milliGRAM(s) IV Push every 6 hours PRN SBP>150  hydrALAZINE Injectable 10 milliGRAM(s) IV Push every 2 hours PRN SBP>150  metoclopramide Injectable 10 milliGRAM(s) IV Push every 8 hours PRN nausea  oxyCODONE    IR 10 milliGRAM(s) Oral every 4 hours PRN Severe Pain (7 - 10)  oxyCODONE    IR 5 milliGRAM(s) Oral every 4 hours PRN Moderate Pain (4 - 6)        CULTURES:    RADIOLOGY & ADDITIONAL TESTS:      ASSESSMENT:  65y Male s/p patient presents with chronic back pain progressing over years. He had a laminectomy 30 years ago as well as 2 anterior cervical surgeries in 2012, and 2017. all surgeries went fine with good recovery. in december 2017 he had a L2-S1 Decompression and fusion for back and left leg pain which improved post op, s/p Posterior exploration L2-S1 osteotomy and TLIF, T10-L2 Posterior fusion (1/4) POD#1.  Pain moderately controlled.   - Continue Acetominophen 1000 mg Po q8h   - Continue Oxycodone 5-10 mg Po q4h prn moderate-severe pain (patient using sparingly)   - Continue pregabalin to 75 mg Po q8h   - DISCONTINUE methocarbamaol to 750 mg PO q8h and start Valium 5 mg PO q8h prn  - Continue bowel regimen  - Please set up an appointment for the patient with my office this next week , as he will need pain med follow up before he returns to [place]     Plan discussed with patient and team.  Will continue to follow.    
 to follow consult note: Neurosurgery:  AM was positive for hypomagnesemia and hypophosphatemia They were both corrected. 
HPI:  patient presents with chronic back pain progressing over years. He had a laminectomy 30 years ago as well as 2 anterior cervical surgeries in 2012, and 2017. all surgeries went fine with good recovery. in december 2017 he had a L2-S1 Decompression and fusion for back and left leg pain which improved post op. (04 Jan 2021 15:04)      Hospital course:  1/4: POD0 s/p Posterior exploration L2-S1 osteotomy and TLIF, T10-L2 Posterior fusion complicated by durotomy, wrapped with duraseal, Dr. Metz plastics closure  1/5: POD1, OLGA overnight, neuro stable, lay flat until tomorrow, TLSO at bedside  1/6: POD2, OLGA overnight, neuro stable, pain well controlled, raise HOB and d/c park today    Vital Signs Last 24 Hrs  T(C): 37.1 (06 Jan 2021 01:10), Max: 37.2 (05 Jan 2021 21:20)  T(F): 98.7 (06 Jan 2021 01:10), Max: 98.9 (05 Jan 2021 21:20)  HR: 64 (06 Jan 2021 01:10) (64 - 69)  BP: 141/67 (06 Jan 2021 01:10) (126/61 - 149/62)  BP(mean): --  RR: 15 (06 Jan 2021 01:10) (15 - 18)  SpO2: 96% (06 Jan 2021 01:10) (93% - 96%)    I&O's Summary    04 Jan 2021 07:01  -  05 Jan 2021 07:00  --------------------------------------------------------  IN: 1750 mL / OUT: 2858 mL / NET: -1108 mL    05 Jan 2021 07:01  -  06 Jan 2021 05:04  --------------------------------------------------------  IN: 3240 mL / OUT: 2661 mL / NET: 579 mL        PHYSICAL EXAM:  General: patient seen laying supine in bed in NAD  Neuro: AAOx3, FC, OE spontaneously, speech clear and fluent, face symmetric, strength 5/5 b/l UE and LE, SILT throughout  HEENT: PERRL, EOMI  Neck: supple  Cardiac: RRR, S1S2  Pulmonary: chest rise symmetric  Abdomen: soft, nontender, nondistended  Ext: warm, perfusing well  Back: incision c/d/i, 2 x Bile bags , 2 x JPs    TUBES/LINES:  [] Park  [] Lumbar Drain  [x] Wound Drains: 2 x Bile bags , 2 x JPs  [] Others      DIET:  [] NPO  [x] Mechanical  [] Tube feeds    LABS:                        10.8   15.80 )-----------( 177      ( 05 Jan 2021 08:11 )             32.9     01-05    142  |  105  |  14  ----------------------------<  230<H>  4.5   |  27  |  0.70    Ca    8.7      05 Jan 2021 08:11  Phos  2.4     01-05  Mg     2.1     01-05              CAPILLARY BLOOD GLUCOSE      POCT Blood Glucose.: 158 mg/dL (05 Jan 2021 22:37)  POCT Blood Glucose.: 135 mg/dL (05 Jan 2021 17:22)  POCT Blood Glucose.: 145 mg/dL (05 Jan 2021 12:07)  POCT Blood Glucose.: 214 mg/dL (05 Jan 2021 07:14)      Drug Levels: [] N/A    CSF Analysis: [] N/A      Allergies    No Known Drug Allergies  shellfish (Rash)    Intolerances      MEDICATIONS:  Antibiotics:    Neuro:  acetaminophen   Tablet .. 1000 milliGRAM(s) Oral every 8 hours  DULoxetine 20 milliGRAM(s) Oral daily  methocarbamol 750 milliGRAM(s) Oral every 8 hours  metoclopramide Injectable 10 milliGRAM(s) IV Push every 8 hours PRN  ondansetron Injectable 4 milliGRAM(s) IV Push every 6 hours  oxyCODONE    IR 10 milliGRAM(s) Oral every 4 hours PRN  oxyCODONE    IR 5 milliGRAM(s) Oral every 4 hours PRN  pregabalin 75 milliGRAM(s) Oral every 8 hours    Anticoagulation:  enoxaparin Injectable 40 milliGRAM(s) SubCutaneous at bedtime    OTHER:  BUpivacaine liposome 1.3% Injectable (no eMAR) 20 milliLiter(s) Local Injection once  dextrose 40% Gel 15 Gram(s) Oral once  dextrose 50% Injectable 25 Gram(s) IV Push once  dextrose 50% Injectable 12.5 Gram(s) IV Push once  dextrose 50% Injectable 25 Gram(s) IV Push once  enalaprilat Injectable 1.25 milliGRAM(s) IV Push every 6 hours PRN  glucagon  Injectable 1 milliGRAM(s) IntraMuscular once  hydrALAZINE Injectable 10 milliGRAM(s) IV Push every 2 hours PRN  insulin lispro (ADMELOG) corrective regimen sliding scale   SubCutaneous Before meals and at bedtime  nicotine  Polacrilex Gum 2 milliGRAM(s) Oral four times a day  pantoprazole    Tablet 40 milliGRAM(s) Oral before breakfast  senna 2 Tablet(s) Oral at bedtime  simvastatin 10 milliGRAM(s) Oral at bedtime    IVF:  dextrose 5%. 1000 milliLiter(s) IV Continuous <Continuous>  dextrose 5%. 1000 milliLiter(s) IV Continuous <Continuous>  multiple electrolytes Injection Type 1 1000 milliLiter(s) IV Continuous <Continuous>  sodium chloride 0.9%. 1000 milliLiter(s) IV Continuous <Continuous>    CULTURES:    RADIOLOGY & ADDITIONAL TESTS:      ASSESSMENT:  65y Male s/p patient presents with chronic back pain progressing over years. He had a laminectomy 30 years ago as well as 2 anterior cervical surgeries in 2012, and 2017. all surgeries went fine with good recovery. in december 2017 he had a L2-S1 Decompression and fusion for back and left leg pain which improved post op, s/p Posterior exploration L2-S1 osteotomy and TLIF, T10-L2 Posterior fusion (1/4) POD#1    Z98.1    Handoff    MEWS Score    Back pain    DM (diabetes mellitus)    HTN (hypertension)    Chronic bilateral back pain, unspecified back location    Fusion, spine, lumbar, TLIF, complex    Fusion, spine, lumbar, PLIF, with discectomy or laminectomy    H/O laminectomy    Surgery, elective    Surgery, elective    DM (diabetes mellitus)    SysAdmin_VstLnk        PLAN:  - monitor CORA x 2 and Bile Bag x 2 output, Dr. Isaías dorsey  - raise HOB today and d/c park  - ADAT  - Pain control, Dr. Soni following  - neuro/vital checks  - SCDs, IS, Bowel Regimen  - TLSO at bedside  - resume PT/OT when bedrest lifted      D/w Dr. Betts    Assessment:  Present when checked    []  GCS  E   V  M     Heart Failure: []Acute, [] acute on chronic , []chronic  Heart Failure:  [] Diastolic (HFpEF), [] Systolic (HFrEF), []Combined (HFpEF and HFrEF), [] RHF, [] Pulm HTN, [] Other    [] KRYSTAL, [] ATN, [] AIN, [] other  [] CKD1, [] CKD2, [] CKD 3, [] CKD 4, [] CKD 5, []ESRD    Encephalopathy: [] Metabolic, [] Hepatic, [] toxic, [] Neurological, [] Other    Abnormal Nurtitional Status: [] malnurtition (see nutrition note), [ ]underweight: BMI < 19, [] morbid obesity: BMI >40, [] Cachexia    [] Sepsis  [] hypovolemic shock,[] cardiogenic shock, [] hemorrhagic shock, [] neuogenic shock  [] Acute Respiratory Failure  []Cerebral edema, [] Brain compression/ herniation,   [] Functional quadriplegia  [] Acute blood loss anemia  
HPI:  patient presents with chronic back pain progressing over years. He had a laminectomy 30 years ago as well as 2 anterior cervical surgeries in 2012, and 2017. all surgeries went fine with good recovery. in december 2017 he had a L2-S1 Decompression and fusion for back and left leg pain which improved post op. (04 Jan 2021 15:04)      ICU Vital Signs Last 24 Hrs  T(C): 37.2 (04 Jan 2021 14:00), Max: 37.2 (04 Jan 2021 14:00)  T(F): 98.9 (04 Jan 2021 14:00), Max: 98.9 (04 Jan 2021 14:00)  HR: 72 (04 Jan 2021 15:30) (71 - 90)  BP: 171/80 (04 Jan 2021 15:30) (164/83 - 182/88)  BP(mean): 118 (04 Jan 2021 15:30) (114 - 127)  ABP: --  ABP(mean): --  RR: 15 (04 Jan 2021 15:30) (9 - 21)  SpO2: 99% (04 Jan 2021 15:30) (98% - 100%)    Exam:  AOX3, sitting comfortably in Pre op holding  5/5 Bilateral lower extremities  1+ bilateral patellar reflexes 1+ bilateral achilles reflexes  no sensory deficits of lower extremities   Tenderness to palpation b/l SIJs  Back: incision c/d/i, 2 x Bile bags , 2 x JPs,   + park    A/P  patient presents with chronic back pain progressing over years. He had a laminectomy 30 years ago as well as 2 anterior cervical surgeries in 2012, and 2017. all surgeries went fine with good recovery. in december 2017 he had a L2-S1 Decompression and fusion for back and left leg pain which improved post op, s/p Posterior exploration L2-S1 osteotomy and TLIF, T10-L2 Posterior fusion (1/4) POD#0  - monitor CORA x 2 and Bile Bag x 2 output  - HOB flat until Wednesday, with diet privileges HOB 15  - park in until Wed  - Pain Meds PRN  - SCDs, IS, Bowel Regimen  - TLSO ordered from [facility]  - trend H/H, s/p transfusion ( 1/4) intra-op  - PT/OT on hold until bedrest is lifted  - D/w Dr. Betts
NP Note Neurosurgery    HPI:  patient presents with chronic back pain progressing over years. He had a laminectomy 30 years ago as well as 2 anterior cervical surgeries in , and 2017. all surgeries went fine with good recovery. in 2017 he had a L2-S1 Decompression and fusion for back and left leg pain which improved post op. (2021 15:04)    OVERNIGHT EVENTS:  Vital Signs Last 24 Hrs  T(C): 37.1 (2021 07:10), Max: 38.7 (2021 14:43)  T(F): 98.8 (2021 07:10), Max: 101.7 (2021 14:43)  HR: 79 (2021 07:10) (70 - 96)  BP: 160/77 (2021 07:10) (120/60 - 171/73)  BP(mean): --  RR: 18 (2021 07:10) (18 - 20)  SpO2: 96% (2021 07:10) (92% - 96%)    I&O's Summary    2021 07:01  -  2021 07:00  --------------------------------------------------------  IN: 1125 mL / OUT: 3412 mL / NET: -2287 mL      Hospital course:  : POD0 s/p Posterior exploration L2-S1 osteotomy and TLIF, T10-L2 Posterior fusion complicated by durotomy, wrapped with Dr. Isaías robin plastics closure  : POD1, OLGA overnight, neuro stable, lay flat until tomorrow, TLSO at bedside  : POD2, OLGA overnight, neuro stable, pain well controlled, raise HOB and d/c park today  : POD #3 uneventful night  LLE improving   Abdomen slightly distneded + BS non tender will obtain abdominal xray  Needs BM will order laxatives  drains X4  Pain Managment  Continue OT/PT      PHYSICAL EXAM:  General: patient seen laying supine in bed in NAD  Neuro: AAOx3, FC, OE spontaneously, speech clear and fluent, face symmetric, strength 5/5 b/l UE and LE, SILT throughout  HEENT: PERRL, EOMI  Neck: supple  Cardiac: RRR, S1S2  Pulmonary: chest rise symmetric  Abdomen: distended +BS nontender  Ext: warm, perfusing well  Back: incision c/d/i, 2 x Bile bags , 2 x JPs             DIET: regular      LABS:                        10.3   10.59 )-----------( 148      ( 2021 05:54 )             31.5     -07    137  |  101  |  8   ----------------------------<  184<H>  4.4   |  27  |  0.71    Ca    8.5      2021 05:54  Phos  1.9     -  Mg     1.4         TPro  6.0  /  Alb  3.3  /  TBili  0.5  /  DBili  x   /  AST  16  /  ALT  12  /  AlkPhos  67        Urinalysis Basic - ( 2021 15:45 )    Color: Yellow / Appearance: Clear / S.010 / pH: x  Gluc: x / Ketone: 15 mg/dL  / Bili: Negative / Urobili: 0.2 E.U./dL   Blood: x / Protein: NEGATIVE mg/dL / Nitrite: NEGATIVE   Leuk Esterase: NEGATIVE / RBC: < 5 /HPF / WBC < 5 /HPF   Sq Epi: x / Non Sq Epi: 0-5 /HPF / Bacteria: Present /HPF          CAPILLARY BLOOD GLUCOSE      POCT Blood Glucose.: 180 mg/dL (2021 08:23)  POCT Blood Glucose.: 209 mg/dL (2021 22:30)  POCT Blood Glucose.: 198 mg/dL (2021 17:17)  POCT Blood Glucose.: 165 mg/dL (2021 12:03)      Drug Levels: [] N/A    CSF Analysis: [] N/A      Allergies    No Known Drug Allergies  shellfish (Rash)    Intolerances      MEDICATIONS:  Antibiotics:    Neuro:  acetaminophen   Tablet .. 1000 milliGRAM(s) Oral every 8 hours  DULoxetine 20 milliGRAM(s) Oral daily  methocarbamol 750 milliGRAM(s) Oral every 8 hours  metoclopramide Injectable 10 milliGRAM(s) IV Push every 8 hours PRN  ondansetron Injectable 4 milliGRAM(s) IV Push every 6 hours  oxyCODONE    IR 5 milliGRAM(s) Oral every 4 hours PRN  oxyCODONE    IR 10 milliGRAM(s) Oral every 4 hours PRN  pregabalin 75 milliGRAM(s) Oral every 8 hours    Anticoagulation:  enoxaparin Injectable 40 milliGRAM(s) SubCutaneous at bedtime    OTHER:  BACItracin   Ointment 1 Application(s) Topical two times a day  BUpivacaine liposome 1.3% Injectable (no eMAR) 20 milliLiter(s) Local Injection once  dextrose 40% Gel 15 Gram(s) Oral once  dextrose 50% Injectable 25 Gram(s) IV Push once  dextrose 50% Injectable 12.5 Gram(s) IV Push once  dextrose 50% Injectable 25 Gram(s) IV Push once  enalaprilat Injectable 1.25 milliGRAM(s) IV Push every 6 hours PRN  glucagon  Injectable 1 milliGRAM(s) IntraMuscular once  hydrALAZINE Injectable 10 milliGRAM(s) IV Push every 2 hours PRN  insulin lispro (ADMELOG) corrective regimen sliding scale   SubCutaneous Before meals and at bedtime  nicotine  Polacrilex Gum 2 milliGRAM(s) Oral four times a day  pantoprazole    Tablet 40 milliGRAM(s) Oral before breakfast  senna 2 Tablet(s) Oral at bedtime  simvastatin 10 milliGRAM(s) Oral at bedtime    IVF:  dextrose 5%. 1000 milliLiter(s) IV Continuous <Continuous>  dextrose 5%. 1000 milliLiter(s) IV Continuous <Continuous>  multiple electrolytes Injection Type 1 1000 milliLiter(s) IV Continuous <Continuous>  potassium phosphate / sodium phosphate Powder (PHOS-NaK) 1 Packet(s) Oral once  sodium chloride 0.9%. 1000 milliLiter(s) IV Continuous <Continuous>    CULTURES:  Culture Results:   No growth at 12 hours ( @ 17:14)  Culture Results:   No growth at 12 hours ( @ 17:14)    RADIOLOGY & ADDITIONAL TESTS:    ASSESSMENT:  65y Male s/p patient presents with chronic back pain progressing over years. He had a laminectomy 30 years ago as well as 2 anterior cervical surgeries in , and . all surgeries went fine with good recovery. in 2017 he had a L2-S1 Decompression and fusion for back and left leg pain which improved post op, s/p Posterior exploration L2-S1 osteotomy and TLIF, T10-L2 Posterior fusion () POD#3    PLAN:  NEURO:    Monitor neuro status  OT/PT  Pain Managment  Monitor drain output  Remove right bile bag and the left neto    CARDIOVASCULAR:    Monitor BP  Transfer to regional    PULMONARY:    Encourage CDB  Incentive Spirometry    RENAL:    Monitor urine output  Do a Bladder Scan    GI:    Abdomen distended  Abdominal xray ordered  Continue IVF  Bowel regime        ID:    Low grade fever  Use Incentive Spirometry      DVT PROPHYLAXIS:  [x] Venodynes                                [] Heparin/Lovenox    FALL RISK:  [x] Low Risk                                    [] Impulsive  Assessment:  Present when checked    
HPI:  patient presents with chronic back pain progressing over years. He had a laminectomy 30 years ago as well as 2 anterior cervical surgeries in , and . all surgeries went fine with good recovery. in 2017 he had a L2-S1 Decompression and fusion for back and left leg pain which improved post op. (2021 15:04)    14: POD0 s/p Posterior exploration L2-S1 osteotomy and TLIF, T10-L2 Posterior fusion complicated by durotomy, wrapped with duraseal, Dr. Metz plastics closure  : POD1, OLGA overnight, neuro stable, lay flat until tomorrow, TLSO at bedside  : POD2, LOGA overnight, neuro stable, pain well controlled, raise HOB and d/c park today  : POD #3 uneventful night  LLE improving   Abdomen slightly distneded + BS non tender will obtain abdominal xray  Needs BM will order laxatives  drains X4  Pain Managment  Continue OT/PT  : POD #4: concern for ileus, remains on clears, flomax resumed, monitoring drain output    OVERNIGHT EVENTS:  Vital Signs Last 24 Hrs  T(C): 37.2 (2021 19:52), Max: 37.6 (2021 13:20)  T(F): 99 (2021 19:52), Max: 99.6 (2021 13:20)  HR: 83 (2021 19:52) (70 - 97)  BP: 136/71 (2021 19:52) (134/70 - 171/91)  BP(mean): --  RR: 18 (2021 19:52) (18 - 19)  SpO2: 95% (2021 19:52) (95% - 97%)    I&O's Summary    2021 07:01  -  2021 07:00  --------------------------------------------------------  IN: 1125 mL / OUT: 3412 mL / NET: -2287 mL    2021 07:01  -  2021 00:46  --------------------------------------------------------  IN: 600 mL / OUT: 1420 mL / NET: -820 mL        PHYSICAL EXAM:  General: patient seen laying supine in bed in NAD  Neuro: AAOx3, FC, OE spontaneously, speech clear and fluent, face symmetric, strength 5/5 b/l UE and LE, SILT throughout  HEENT: PERRL, EOMI  Neck: supple  Cardiac: RRR, S1S2  Pulmonary: chest rise symmetric  Abdomen: distended +BS nontender  Ext: warm, perfusing well  Back: incision c/d/i     TUBES/LINES:  [] Park  [] Lumbar Drain  [x] Wound Drains  [] Others      DIET:  [] NPO  [x] Mechanical  [] Tube feeds    LABS:                        10.3   10.59 )-----------( 148      ( 2021 05:54 )             31.5     -    137  |  101  |  8   ----------------------------<  184<H>  4.4   |  27  |  0.71    Ca    8.5      2021 05:54  Phos  1.9     -  Mg     1.4     -    TPro  6.0  /  Alb  3.3  /  TBili  0.5  /  DBili  x   /  AST  16  /  ALT  12  /  AlkPhos  67  -07      Urinalysis Basic - ( 2021 15:45 )    Color: Yellow / Appearance: Clear / S.010 / pH: x  Gluc: x / Ketone: 15 mg/dL  / Bili: Negative / Urobili: 0.2 E.U./dL   Blood: x / Protein: NEGATIVE mg/dL / Nitrite: NEGATIVE   Leuk Esterase: NEGATIVE / RBC: < 5 /HPF / WBC < 5 /HPF   Sq Epi: x / Non Sq Epi: 0-5 /HPF / Bacteria: Present /HPF          CAPILLARY BLOOD GLUCOSE      POCT Blood Glucose.: 241 mg/dL (2021 21:42)  POCT Blood Glucose.: 226 mg/dL (2021 16:56)  POCT Blood Glucose.: 246 mg/dL (2021 12:20)  POCT Blood Glucose.: 180 mg/dL (2021 08:23)      Drug Levels: [] N/A    CSF Analysis: [] N/A      Allergies    No Known Drug Allergies  shellfish (Rash)    Intolerances      MEDICATIONS:  Antibiotics:    Neuro:  acetaminophen   Tablet .. 1000 milliGRAM(s) Oral every 8 hours  DULoxetine 20 milliGRAM(s) Oral daily  methocarbamol 750 milliGRAM(s) Oral every 8 hours  metoclopramide Injectable 10 milliGRAM(s) IV Push every 8 hours PRN  ondansetron Injectable 4 milliGRAM(s) IV Push every 6 hours  oxyCODONE    IR 10 milliGRAM(s) Oral every 4 hours PRN  oxyCODONE    IR 5 milliGRAM(s) Oral every 4 hours PRN  pregabalin 75 milliGRAM(s) Oral every 8 hours    Anticoagulation:  enoxaparin Injectable 40 milliGRAM(s) SubCutaneous at bedtime    OTHER:  BACItracin   Ointment 1 Application(s) Topical two times a day  bisacodyl 10 milliGRAM(s) Oral at bedtime  BUpivacaine liposome 1.3% Injectable (no eMAR) 20 milliLiter(s) Local Injection once  dextrose 40% Gel 15 Gram(s) Oral once  dextrose 50% Injectable 25 Gram(s) IV Push once  dextrose 50% Injectable 25 Gram(s) IV Push once  dextrose 50% Injectable 12.5 Gram(s) IV Push once  enalaprilat Injectable 1.25 milliGRAM(s) IV Push every 6 hours PRN  glucagon  Injectable 1 milliGRAM(s) IntraMuscular once  hydrALAZINE Injectable 10 milliGRAM(s) IV Push every 2 hours PRN  insulin lispro (ADMELOG) corrective regimen sliding scale   SubCutaneous Before meals and at bedtime  lisinopril 40 milliGRAM(s) Oral daily  nicotine  Polacrilex Gum 2 milliGRAM(s) Oral four times a day  pantoprazole    Tablet 40 milliGRAM(s) Oral before breakfast  polyethylene glycol 3350 17 Gram(s) Oral daily  senna 2 Tablet(s) Oral at bedtime  simvastatin 10 milliGRAM(s) Oral at bedtime  tamsulosin 0.4 milliGRAM(s) Oral at bedtime    IVF:  dextrose 5%. 1000 milliLiter(s) IV Continuous <Continuous>  dextrose 5%. 1000 milliLiter(s) IV Continuous <Continuous>  sodium chloride 0.9%. 1000 milliLiter(s) IV Continuous <Continuous>    CULTURES:  Culture Results:   No growth at 1 day. ( @ 17:14)  Culture Results:   No growth at 1 day. ( @ 17:14)    RADIOLOGY & ADDITIONAL TESTS:      ASSESSMENT:  65y Male s/p patient presents with chronic back pain progressing over years. He had a laminectomy 30 years ago as well as 2 anterior cervical surgeries in , and . all surgeries went fine with good recovery. in 2017 he had a L2-S1 Decompression and fusion for back and left leg pain which improved post op, s/p Posterior exploration L2-S1 osteotomy and TLIF, T10-L2 Posterior fusion () POD#4    PLAN:  NEURO:  Monitor neuro status  OT/PT  Pain Managment  Monitor drain output    CARDIOVASCULAR:  stable  on home meds    PULMONARY:  Encourage OOB  Incentive Spirometry    RENAL:  trend UOP  straight cath prn  cont flomax    GI:  KUB c/w ileus  clears only  bowel regimen    ID:  afeb  no abx    DVT PROPHYLAXIS:  [x] Venodynes                                [x] Heparin/Lovenox    d/w Dr. Betts  full code  dispo pending 
HPI:  patient presents with chronic back pain progressing over years. He had a laminectomy 30 years ago as well as 2 anterior cervical surgeries in 2012, and 2017. all surgeries went fine with good recovery. in december 2017 he had a L2-S1 Decompression and fusion for back and left leg pain which improved post op. (04 Jan 2021 15:04)    Hospital course:  1/4: POD0 s/p Posterior exploration L2-S1 osteotomy and TLIF, T10-L2 Posterior fusion complicated by durotomy, wrapped with duraseal, Dr. Metz plastics closure  1/5: POD1, OLGA overnight, neuro stable, patient describes tightness in the lower back wrapping around the hips    Home Medications:  DULoxetine 20 mg oral delayed release capsule: 1 cap(s) orally once a day (at bedtime) (04 Jan 2021 06:38)  Januvia 100 mg oral tablet: 1 tab(s) orally once a day (04 Jan 2021 06:38)  metFORMIN: 2000 milligram(s) orally once a day (04 Jan 2021 06:38)  PriLOSEC 40 mg oral delayed release capsule: 1 cap(s) orally once a day (04 Jan 2021 06:38)  Trulicity Pen 1.5 mg/0.5 mL subcutaneous solution: subcutaneous every 7 days (04 Jan 2021 06:38)  Zocor 10 mg oral tablet: 1 tab(s) orally once a day (at bedtime) (04 Jan 2021 06:38)    Vital Signs Last 24 Hrs  T(C): 36.7 (05 Jan 2021 07:08), Max: 37.2 (04 Jan 2021 14:00)  T(F): 98 (05 Jan 2021 07:08), Max: 98.9 (04 Jan 2021 14:00)  HR: 68 (05 Jan 2021 07:08) (68 - 90)  BP: 149/62 (05 Jan 2021 07:08) (142/67 - 182/88)  BP(mean): 96 (04 Jan 2021 21:00) (96 - 127)  RR: 17 (05 Jan 2021 07:08) (7 - 21)  SpO2: 95% (05 Jan 2021 07:08) (95% - 100%)    I&O's Summary    04 Jan 2021 07:01  -  05 Jan 2021 01:54  --------------------------------------------------------  IN: 800 mL / OUT: 985 mL / NET: -185 mL        PHYSICAL EXAM:  General: patient seen laying supine in bed in NAD  Neuro: AAOx3, FC, OE spontaneously, speech clear and fluent, face symmetric, strength 5/5 b/l UE and LE, SILT throughout  HEENT: PERRL, EOMI  Neck: supple  Cardiac: RRR, S1S2  Pulmonary: chest rise symmetric  Abdomen: soft, nontender, nondistended  Ext: warm, perfusing well  Back: incision c/d/i, 2 x Bile bags , 2 x JPs        TUBES/LINES:  [x] [place]  [] Lumbar Drain  [x] Wound Drains - deep bile bags x 2, superficial CORA x 2  [] Others      DIET:  [] NPO  [x] Mechanical - clears  [] Tube feeds    LABS:                        11.1   10.54 )-----------( 166      ( 04 Jan 2021 14:45 )             33.6     01-04    140  |  103  |  15  ----------------------------<  281<H>  4.0   |  22  |  0.70    Ca    8.1<L>      04 Jan 2021 14:45  Phos  2.7     01-04  Mg     2.0     01-04              CAPILLARY BLOOD GLUCOSE      POCT Blood Glucose.: 225 mg/dL (04 Jan 2021 22:29)  POCT Blood Glucose.: 211 mg/dL (04 Jan 2021 20:20)  POCT Blood Glucose.: 277 mg/dL (04 Jan 2021 14:20)  POCT Blood Glucose.: 161 mg/dL (04 Jan 2021 06:25)      Drug Levels: [] N/A    CSF Analysis: [] N/A      Allergies    No Known Drug Allergies  shellfish (Rash)    Intolerances  MEDICATIONS  (STANDING):  acetaminophen   Tablet .. 1000 milliGRAM(s) Oral every 8 hours  BUpivacaine liposome 1.3% Injectable (no eMAR) 20 milliLiter(s) Local Injection once  dextrose 40% Gel 15 Gram(s) Oral once  dextrose 5%. 1000 milliLiter(s) (50 mL/Hr) IV Continuous <Continuous>  dextrose 5%. 1000 milliLiter(s) (100 mL/Hr) IV Continuous <Continuous>  dextrose 50% Injectable 25 Gram(s) IV Push once  dextrose 50% Injectable 12.5 Gram(s) IV Push once  dextrose 50% Injectable 25 Gram(s) IV Push once  DULoxetine 20 milliGRAM(s) Oral daily  enoxaparin Injectable 40 milliGRAM(s) SubCutaneous at bedtime  glucagon  Injectable 1 milliGRAM(s) IntraMuscular once  insulin lispro (ADMELOG) corrective regimen sliding scale   SubCutaneous Before meals and at bedtime  methocarbamol 500 milliGRAM(s) Oral every 8 hours  multiple electrolytes Injection Type 1 1000 milliLiter(s) (150 mL/Hr) IV Continuous <Continuous>  nicotine  Polacrilex Gum 2 milliGRAM(s) Oral four times a day  ondansetron Injectable 4 milliGRAM(s) IV Push every 6 hours  pantoprazole    Tablet 40 milliGRAM(s) Oral before breakfast  pregabalin 50 milliGRAM(s) Oral three times a day  senna 2 Tablet(s) Oral at bedtime  simvastatin 10 milliGRAM(s) Oral at bedtime  sodium chloride 0.9%. 1000 milliLiter(s) (100 mL/Hr) IV Continuous <Continuous>    MEDICATIONS  (PRN):  enalaprilat Injectable 1.25 milliGRAM(s) IV Push every 6 hours PRN SBP>150  hydrALAZINE Injectable 10 milliGRAM(s) IV Push every 2 hours PRN SBP>150  metoclopramide Injectable 10 milliGRAM(s) IV Push every 8 hours PRN nausea  oxyCODONE    IR 5 milliGRAM(s) Oral every 6 hours PRN Moderate Pain (4 - 6)  oxyCODONE    IR 10 milliGRAM(s) Oral every 6 hours PRN Severe Pain (7 - 10)    CULTURES:    RADIOLOGY & ADDITIONAL TESTS:      ASSESSMENT:  65y Male s/p patient presents with chronic back pain progressing over years. He had a laminectomy 30 years ago as well as 2 anterior cervical surgeries in 2012, and 2017. all surgeries went fine with good recovery. in december 2017 he had a L2-S1 Decompression and fusion for back and left leg pain which improved post op, s/p Posterior exploration L2-S1 osteotomy and TLIF, T10-L2 Posterior fusion (1/4) POD#1.  Pain moderately controlled.   - Continue Acetominophen 1000 mg Po q8h   - Continue Oxycodone 5-10 mg Po q4h prn moderate-severe pain (patient using sparingly)   - Increase pregabalin to 75 mg Po q8h   - Increase methocarbamaol to 750 mg PO q8h  - Continue bowel regimen    Plan discussed with patient and team.  Will continue to follow.    
HPI:  patient presents with chronic back pain progressing over years. He had a laminectomy 30 years ago as well as 2 anterior cervical surgeries in 2012, and 2017. all surgeries went fine with good recovery. in december 2017 he had a L2-S1 Decompression and fusion for back and left leg pain which improved post op. (04 Jan 2021 15:04)    Hospital course:  1/4: POD0 s/p Posterior exploration L2-S1 osteotomy and TLIF, T10-L2 Posterior fusion complicated by durotomy, wrapped with duraseal, Dr. Metz plastics closure  1/5: POD1, OLGA overnight, neuro stable, patient describes tightness in the lower back wrapping around the hips    Home Medications:  DULoxetine 20 mg oral delayed release capsule: 1 cap(s) orally once a day (at bedtime) (04 Jan 2021 06:38)  Januvia 100 mg oral tablet: 1 tab(s) orally once a day (04 Jan 2021 06:38)  metFORMIN: 2000 milligram(s) orally once a day (04 Jan 2021 06:38)  PriLOSEC 40 mg oral delayed release capsule: 1 cap(s) orally once a day (04 Jan 2021 06:38)  Trulicity Pen 1.5 mg/0.5 mL subcutaneous solution: subcutaneous every 7 days (04 Jan 2021 06:38)  Zocor 10 mg oral tablet: 1 tab(s) orally once a day (at bedtime) (04 Jan 2021 06:38)    Vital Signs Last 24 Hrs  T(C): 37.7 (06 Jan 2021 07:00), Max: 38.1 (06 Jan 2021 06:15)  T(F): 99.8 (06 Jan 2021 07:00), Max: 100.6 (06 Jan 2021 06:15)  HR: 82 (06 Jan 2021 07:00) (64 - 87)  BP: 151/67 (06 Jan 2021 07:00) (126/61 - 160/74)  BP(mean): --  RR: 16 (06 Jan 2021 07:00) (15 - 18)  SpO2: 96% (06 Jan 2021 07:00) (93% - 96%)I&O's Summary    04 Jan 2021 07:01  -  05 Jan 2021 01:54  --------------------------------------------------------  IN: 800 mL / OUT: 985 mL / NET: -185 mL        PHYSICAL EXAM:  General: patient seen laying supine in bed in NAD  Neuro: AAOx3, FC, OE spontaneously, speech clear and fluent, face symmetric, strength 5/5 b/l UE and LE, SILT throughout  HEENT: PERRL, EOMI  Neck: supple  Cardiac: RRR, S1S2  Pulmonary: chest rise symmetric  Abdomen: soft, nontender, nondistended  Ext: warm, perfusing well  Back: incision c/d/i, 2 x Bile bags , 2 x JPs        TUBES/LINES:  [x] [place]  [] Lumbar Drain  [x] Wound Drains - deep bile bags x 2, superficial CORA x 2  [] Others      DIET:  [] NPO  [x] Mechanical - clears  [] Tube feeds    LABS:                        11.1   10.54 )-----------( 166      ( 04 Jan 2021 14:45 )             33.6     01-04    140  |  103  |  15  ----------------------------<  281<H>  4.0   |  22  |  0.70    Ca    8.1<L>      04 Jan 2021 14:45  Phos  2.7     01-04  Mg     2.0     01-04              CAPILLARY BLOOD GLUCOSE      POCT Blood Glucose.: 225 mg/dL (04 Jan 2021 22:29)  POCT Blood Glucose.: 211 mg/dL (04 Jan 2021 20:20)  POCT Blood Glucose.: 277 mg/dL (04 Jan 2021 14:20)  POCT Blood Glucose.: 161 mg/dL (04 Jan 2021 06:25)      Drug Levels: [] N/A    CSF Analysis: [] N/A      Allergies    No Known Drug Allergies  shellfish (Rash)    Intolerances  MEDICATIONS  (STANDING):  acetaminophen   Tablet .. 1000 milliGRAM(s) Oral every 8 hours  BUpivacaine liposome 1.3% Injectable (no eMAR) 20 milliLiter(s) Local Injection once  dextrose 40% Gel 15 Gram(s) Oral once  dextrose 5%. 1000 milliLiter(s) (50 mL/Hr) IV Continuous <Continuous>  dextrose 5%. 1000 milliLiter(s) (100 mL/Hr) IV Continuous <Continuous>  dextrose 50% Injectable 25 Gram(s) IV Push once  dextrose 50% Injectable 12.5 Gram(s) IV Push once  dextrose 50% Injectable 25 Gram(s) IV Push once  DULoxetine 20 milliGRAM(s) Oral daily  enoxaparin Injectable 40 milliGRAM(s) SubCutaneous at bedtime  glucagon  Injectable 1 milliGRAM(s) IntraMuscular once  insulin lispro (ADMELOG) corrective regimen sliding scale   SubCutaneous Before meals and at bedtime  methocarbamol 750 milliGRAM(s) Oral every 8 hours  multiple electrolytes Injection Type 1 1000 milliLiter(s) (150 mL/Hr) IV Continuous <Continuous>  nicotine  Polacrilex Gum 2 milliGRAM(s) Oral four times a day  ondansetron Injectable 4 milliGRAM(s) IV Push every 6 hours  pantoprazole    Tablet 40 milliGRAM(s) Oral before breakfast  pregabalin 75 milliGRAM(s) Oral every 8 hours  senna 2 Tablet(s) Oral at bedtime  simvastatin 10 milliGRAM(s) Oral at bedtime  sodium chloride 0.9%. 1000 milliLiter(s) (100 mL/Hr) IV Continuous <Continuous>    MEDICATIONS  (PRN):  enalaprilat Injectable 1.25 milliGRAM(s) IV Push every 6 hours PRN SBP>150  hydrALAZINE Injectable 10 milliGRAM(s) IV Push every 2 hours PRN SBP>150  metoclopramide Injectable 10 milliGRAM(s) IV Push every 8 hours PRN nausea  oxyCODONE    IR 10 milliGRAM(s) Oral every 4 hours PRN Severe Pain (7 - 10)  oxyCODONE    IR 5 milliGRAM(s) Oral every 4 hours PRN Moderate Pain (4 - 6)      CULTURES:    RADIOLOGY & ADDITIONAL TESTS:      ASSESSMENT:  65y Male s/p patient presents with chronic back pain progressing over years. He had a laminectomy 30 years ago as well as 2 anterior cervical surgeries in 2012, and 2017. all surgeries went fine with good recovery. in december 2017 he had a L2-S1 Decompression and fusion for back and left leg pain which improved post op, s/p Posterior exploration L2-S1 osteotomy and TLIF, T10-L2 Posterior fusion (1/4) POD#1.  Pain moderately controlled.   - Continue Acetominophen 1000 mg Po q8h   - Continue Oxycodone 5-10 mg Po q4h prn moderate-severe pain (patient using sparingly)   - Continue pregabalin to 75 mg Po q8h   - Continue methocarbamaol to 750 mg PO q8h  - Consider ketorolac 30 mg IV q8h if ok per sugeon  - Continue bowel regimen    Plan discussed with patient and team.  Will continue to follow.    
Pt seen and examined. Comfortable.   No acute events.     Exam:  Tmax 101.7 (3pm)  Incision c/d/i. No infection or separation.   Dressings changed. Prineo in place.   Drains: / 24 hours

## 2021-01-09 NOTE — DISCHARGE NOTE NURSING/CASE MANAGEMENT/SOCIAL WORK - PATIENT PORTAL LINK FT
You can access the FollowMyHealth Patient Portal offered by [facility] by registering at the following website: [email or web address]. By joining [employer]’s FollowMyHealth portal, you will also be able to view your health information using other applications (apps) compatible with our system.

## 2021-01-09 NOTE — DISCHARGE NOTE NURSING/CASE MANAGEMENT/SOCIAL WORK - NSDCFUADDAPPT_GEN_ALL_CORE_FT
You will follow-up with Dr. Lynne Johnson at the time their office has provided to you.   Call to schedule a follow up appointment with Dr. Soni in the office for next week (Pain management).  Call to schedule a follow up appointment with Dr. Metz in the office next week (Plastic surgery).

## 2021-01-11 LAB
CULTURE RESULTS: SIGNIFICANT CHANGE UP
CULTURE RESULTS: SIGNIFICANT CHANGE UP
SPECIMEN SOURCE: SIGNIFICANT CHANGE UP
SPECIMEN SOURCE: SIGNIFICANT CHANGE UP

## 2022-09-23 NOTE — PACU DISCHARGE NOTE - MENTAL STATUS: PATIENT PARTICIPATION
Post-Operative Follow-up     This is a 49year old male who presents to the office for a 1 month follow-up s/p L4-S1 laminectomy. Subjective: Patient states he is somewhat better than prior to surgery. He states he still has pain down his left leg with associated numbness. Also states this pain is affecting how he is sleeping at night. Denies any new weakness. No bowel or bladder incontinence. Physical Exam:              WDWN, no apparent distress              Non-labored breathing               Vitals Stable              Alert and oriented x3              CN 3-12 intact              PERRL              EOMI              DC well              Motor strength symmetric              Sensation to LT intact bilaterally   Incision healing well without signs of infection. Assessment: This is a 49 y.o.  male presenting for a 1 month follow-up s/p L4-S1 laminectomy      Plan:  -Pain control and expectations discussed  -Continue restrictions   -OARRS report reviewed   -Follow-up in neurosurgery clinic in 2 months for 3 month follow up.   -Call or return to neurosurgery office sooner if symptoms worsen or if new issues arise in the interim.     Electronically signed by Cindy Cardoza PA-C on 9/23/2022 at 5:21 PM
Awake
